# Patient Record
Sex: MALE | Race: WHITE | NOT HISPANIC OR LATINO | Employment: OTHER | RURAL
[De-identification: names, ages, dates, MRNs, and addresses within clinical notes are randomized per-mention and may not be internally consistent; named-entity substitution may affect disease eponyms.]

---

## 2020-06-18 ENCOUNTER — HISTORICAL (OUTPATIENT)
Dept: ADMINISTRATIVE | Facility: HOSPITAL | Age: 55
End: 2020-06-18

## 2020-06-18 LAB
ALBUMIN SERPL BCP-MCNC: 3.4 G/DL (ref 3.5–5)
ALBUMIN/GLOB SERPL: 1.1 {RATIO}
ALP SERPL-CCNC: 93 U/L (ref 45–115)
ALT SERPL W P-5'-P-CCNC: 25 U/L (ref 16–61)
AST SERPL W P-5'-P-CCNC: 17 U/L (ref 15–37)
BASOPHILS # BLD AUTO: 0.02 X10E3/UL (ref 0–0.2)
BASOPHILS NFR BLD AUTO: 0.2 % (ref 0–1)
BILIRUB SERPL-MCNC: 0.7 MG/DL (ref 0–1.2)
BUN SERPL-MCNC: 39 MG/DL (ref 7–18)
CALCIUM SERPL-MCNC: 8.2 MG/DL (ref 8.5–10.1)
CHLORIDE SERPL-SCNC: 99 MMOL/L (ref 98–107)
CO2 SERPL-SCNC: 17 MMOL/L (ref 21–32)
CREAT SERPL-MCNC: 2.71 MG/DL (ref 0.7–1.3)
EOSINOPHIL # BLD AUTO: 0.01 X10E3/UL (ref 0–0.5)
EOSINOPHIL NFR BLD AUTO: 0.1 % (ref 1–4)
ERYTHROCYTE [DISTWIDTH] IN BLOOD BY AUTOMATED COUNT: 12.8 % (ref 11.5–14.5)
GLOBULIN SER-MCNC: 3.1 G/DL (ref 2–4)
GLUCOSE SERPL-MCNC: 716 MG/DL (ref 74–106)
HCT VFR BLD AUTO: 35.6 % (ref 40–54)
HGB BLD-MCNC: 11.7 G/DL (ref 13.5–18)
LIPASE SERPL-CCNC: 147 U/L (ref 73–393)
LYMPHOCYTES # BLD AUTO: 0.7 X10E3/UL (ref 1–4.8)
LYMPHOCYTES NFR BLD AUTO: 7.3 % (ref 27–41)
MCH RBC QN AUTO: 28.7 PG (ref 27–31)
MCHC RBC AUTO-ENTMCNC: 32.9 G/DL (ref 32–36)
MCV RBC AUTO: 88 FL (ref 80–96)
MONOCYTES # BLD AUTO: 0.48 X10E3/UL (ref 0–0.8)
MONOCYTES NFR BLD AUTO: 5 % (ref 2–6)
MPC BLD CALC-MCNC: 11.1 FL (ref 9.4–12.4)
NEUTROPHILS # BLD AUTO: 8.42 X10E3/UL (ref 1.8–7.7)
NEUTROPHILS NFR BLD AUTO: 87.4 % (ref 53–65)
PLATELET # BLD AUTO: 134 X10E3/UL (ref 150–450)
POTASSIUM SERPL-SCNC: 6.4 MMOL/L (ref 3.5–5.1)
PROT SERPL-MCNC: 6.5 G/DL (ref 6.4–8.2)
RBC # BLD AUTO: 4.07 X10E6/UL (ref 4.6–6.2)
SODIUM SERPL-SCNC: 129 MMOL/L (ref 136–145)
WBC # BLD AUTO: 9.63 X10E3/UL (ref 4.5–11)

## 2020-08-08 ENCOUNTER — HISTORICAL (OUTPATIENT)
Dept: ADMINISTRATIVE | Facility: HOSPITAL | Age: 55
End: 2020-08-08

## 2020-08-08 LAB
ALBUMIN SERPL BCP-MCNC: 3.6 G/DL (ref 3.5–5)
ALBUMIN/GLOB SERPL: 1.2 {RATIO}
ALP SERPL-CCNC: 90 U/L (ref 45–115)
ALT SERPL W P-5'-P-CCNC: 34 U/L (ref 16–61)
ANION GAP SERPL CALCULATED.3IONS-SCNC: 20 MMOL/L (ref 7–16)
AST SERPL W P-5'-P-CCNC: 14 U/L (ref 15–37)
BASOPHILS # BLD AUTO: 0.02 X10E3/UL (ref 0–0.2)
BASOPHILS NFR BLD AUTO: 0.3 % (ref 0–1)
BILIRUB SERPL-MCNC: 0.7 MG/DL (ref 0–1.2)
BUN SERPL-MCNC: 51 MG/DL (ref 7–18)
CALCIUM SERPL-MCNC: 8.5 MG/DL (ref 8.5–10.1)
CHLORIDE SERPL-SCNC: 101 MMOL/L (ref 98–107)
CO2 SERPL-SCNC: 17 MMOL/L (ref 21–32)
CREAT SERPL-MCNC: 2.97 MG/DL (ref 0.7–1.3)
EOSINOPHIL # BLD AUTO: 0.09 X10E3/UL (ref 0–0.5)
EOSINOPHIL NFR BLD AUTO: 1.3 % (ref 1–4)
ERYTHROCYTE [DISTWIDTH] IN BLOOD BY AUTOMATED COUNT: 12.2 % (ref 11.5–14.5)
GLOBULIN SER-MCNC: 2.9 G/DL (ref 2–4)
GLUCOSE SERPL-MCNC: 487 MG/DL (ref 74–106)
HCT VFR BLD AUTO: 33.6 % (ref 40–54)
HGB BLD-MCNC: 11.3 G/DL (ref 13.5–18)
LYMPHOCYTES # BLD AUTO: 1.04 X10E3/UL (ref 1–4.8)
LYMPHOCYTES NFR BLD AUTO: 14.5 % (ref 27–41)
MCH RBC QN AUTO: 28.5 PG (ref 27–31)
MCHC RBC AUTO-ENTMCNC: 33.6 G/DL (ref 32–36)
MCV RBC AUTO: 85 FL (ref 80–96)
MONOCYTES # BLD AUTO: 0.89 X10E3/UL (ref 0–0.8)
MONOCYTES NFR BLD AUTO: 12.4 % (ref 2–6)
MPC BLD CALC-MCNC: 10.5 FL (ref 9.4–12.4)
NEUTROPHILS # BLD AUTO: 5.15 X10E3/UL (ref 1.8–7.7)
NEUTROPHILS NFR BLD AUTO: 71.5 % (ref 53–65)
PLATELET # BLD AUTO: 144 X10E3/UL (ref 150–450)
POTASSIUM SERPL-SCNC: 5.1 MMOL/L (ref 3.5–5.1)
PROT SERPL-MCNC: 6.5 G/DL (ref 6.4–8.2)
RBC # BLD AUTO: 3.96 X10E6/UL (ref 4.6–6.2)
SODIUM SERPL-SCNC: 133 MMOL/L (ref 136–145)
WBC # BLD AUTO: 7.19 X10E3/UL (ref 4.5–11)

## 2020-08-09 ENCOUNTER — HISTORICAL (OUTPATIENT)
Dept: ADMINISTRATIVE | Facility: HOSPITAL | Age: 55
End: 2020-08-09

## 2020-08-09 LAB
ALBUMIN SERPL BCP-MCNC: 3.6 G/DL (ref 3.5–5)
ALBUMIN/GLOB SERPL: 1.2 {RATIO}
ALP SERPL-CCNC: 94 U/L (ref 45–115)
ALT SERPL W P-5'-P-CCNC: 33 U/L (ref 16–61)
ANION GAP SERPL CALCULATED.3IONS-SCNC: 20 MMOL/L (ref 7–16)
ANION GAP SERPL CALCULATED.3IONS-SCNC: 22 MMOL/L (ref 7–16)
AST SERPL W P-5'-P-CCNC: 18 U/L (ref 15–37)
BACTERIA #/AREA URNS HPF: ABNORMAL /HPF
BASOPHILS # BLD AUTO: 0.02 X10E3/UL (ref 0–0.2)
BASOPHILS NFR BLD AUTO: 0.3 % (ref 0–1)
BILIRUB SERPL-MCNC: 0.7 MG/DL (ref 0–1.2)
BILIRUB UR QL STRIP: ABNORMAL MG/DL
BUN SERPL-MCNC: 44 MG/DL (ref 7–18)
BUN SERPL-MCNC: 46 MG/DL (ref 7–18)
CALCIUM SERPL-MCNC: 8.4 MG/DL (ref 8.5–10.1)
CALCIUM SERPL-MCNC: 8.8 MG/DL (ref 8.5–10.1)
CHLORIDE SERPL-SCNC: 102 MMOL/L (ref 98–107)
CHLORIDE SERPL-SCNC: 105 MMOL/L (ref 98–107)
CLARITY UR: CLEAR
CLARITY UR: CLEAR
CO2 SERPL-SCNC: 14 MMOL/L (ref 22–28)
CO2 SERPL-SCNC: 16 MMOL/L (ref 21–32)
CO2 SERPL-SCNC: 16 MMOL/L (ref 21–32)
CO2 SERPL-SCNC: 17 MMOL/L (ref 22–28)
COLOR UR: YELLOW
COLOR UR: YELLOW
CREAT SERPL-MCNC: 2.7 MG/DL (ref 0.7–1.3)
CREAT SERPL-MCNC: 2.88 MG/DL (ref 0.7–1.3)
EOSINOPHIL # BLD AUTO: 0.05 X10E3/UL (ref 0–0.5)
EOSINOPHIL NFR BLD AUTO: 0.7 % (ref 1–4)
ERYTHROCYTE [DISTWIDTH] IN BLOOD BY AUTOMATED COUNT: 12.6 % (ref 11.5–14.5)
GLOBULIN SER-MCNC: 3.1 G/DL (ref 2–4)
GLUCOSE SERPL-MCNC: 275 MG/DL (ref 74–106)
GLUCOSE SERPL-MCNC: 418 MG/DL (ref 74–106)
GLUCOSE UR STRIP-MCNC: ABNORMAL MG/DL
HCO3 UR-SCNC: 13 MMOL/L (ref 21–28)
HCO3 UR-SCNC: 15 MMOL/L (ref 21–28)
HCT VFR BLD AUTO: 37.1 % (ref 40–54)
HGB BLD-MCNC: 12.2 G/DL (ref 13.5–18)
KETONES UR STRIP-SCNC: ABNORMAL MG/DL
LEUKOCYTE ESTERASE UR QL STRIP: NEGATIVE LEU/UL
LYMPHOCYTES # BLD AUTO: 1.04 X10E3/UL (ref 1–4.8)
LYMPHOCYTES NFR BLD AUTO: 14.4 % (ref 27–41)
MAGNESIUM SERPL-MCNC: 1.7 MG/DL (ref 1.7–2.3)
MCH RBC QN AUTO: 28.2 PG (ref 27–31)
MCHC RBC AUTO-ENTMCNC: 32.9 G/DL (ref 32–36)
MCV RBC AUTO: 86 FL (ref 80–96)
MONOCYTES # BLD AUTO: 0.56 X10E3/UL (ref 0–0.8)
MONOCYTES NFR BLD AUTO: 7.7 % (ref 2–6)
MPC BLD CALC-MCNC: 10.5 FL (ref 9.4–12.4)
NEUTROPHILS # BLD AUTO: 5.57 X10E3/UL (ref 1.8–7.7)
NEUTROPHILS NFR BLD AUTO: 76.9 % (ref 53–65)
NITRITE UR QL STRIP: NEGATIVE
PCO2 BLDA: 31 MM HG (ref 35–48)
PCO2 BLDA: 36 MM HG (ref 35–48)
PH SMN: 7.23 PH UNITS (ref 7.35–7.45)
PH SMN: 7.24 PH UNITS (ref 7.35–7.45)
PH UR STRIP: 5.5 PH UNITS (ref 5–8)
PLATELET # BLD AUTO: 152 X10E3/UL (ref 150–450)
PO2 BLDA: 89 MM HG (ref 83–108)
PO2 BLDA: 98 MM HG (ref 83–108)
POC BASE EXCESS ARTERIAL: -11.1 MMOL/L (ref -2–3)
POC BASE EXCESS ARTERIAL: -13.3 MMOL/L (ref -2–3)
POC SATURATED O2: 95 % (ref 95–98)
POC SATURATED O2: 96 % (ref 95–98)
POTASSIUM SERPL-SCNC: 4.6 MMOL/L (ref 3.5–5.1)
POTASSIUM SERPL-SCNC: 4.8 MMOL/L (ref 3.5–5.1)
PROT SERPL-MCNC: 6.7 G/DL (ref 6.4–8.2)
PROT UR QL STRIP: ABNORMAL MG/DL
RBC # BLD AUTO: 4.32 X10E6/UL (ref 4.6–6.2)
RBC # UR STRIP: NEGATIVE ERY/UL
RBC #/AREA URNS HPF: ABNORMAL /HPF (ref 0–3)
SODIUM SERPL-SCNC: 135 MMOL/L (ref 136–145)
SODIUM SERPL-SCNC: 136 MMOL/L (ref 136–145)
SP GR UR STRIP: 1.02 (ref 1–1.03)
SQUAMOUS #/AREA URNS LPF: ABNORMAL /LPF
UROBILINOGEN UR STRIP-ACNC: 0.2 MG/DL
WBC # BLD AUTO: 7.24 X10E3/UL (ref 4.5–11)
WBC #/AREA URNS HPF: ABNORMAL /HPF (ref 0–5)

## 2020-08-12 LAB
GLUCOSE SERPL-MCNC: 224 MG/DL (ref 70–105)
GLUCOSE SERPL-MCNC: 292 MG/DL (ref 70–105)

## 2020-09-08 ENCOUNTER — HISTORICAL (OUTPATIENT)
Dept: ADMINISTRATIVE | Facility: HOSPITAL | Age: 55
End: 2020-09-08

## 2021-02-09 ENCOUNTER — HISTORICAL (OUTPATIENT)
Dept: ADMINISTRATIVE | Facility: HOSPITAL | Age: 56
End: 2021-02-09

## 2021-02-10 ENCOUNTER — HISTORICAL (OUTPATIENT)
Dept: ADMINISTRATIVE | Facility: HOSPITAL | Age: 56
End: 2021-02-10

## 2021-05-24 ENCOUNTER — LAB REQUISITION (OUTPATIENT)
Dept: LAB | Facility: HOSPITAL | Age: 56
End: 2021-05-24
Payer: MEDICARE

## 2021-05-24 ENCOUNTER — HOSPITAL ENCOUNTER (OUTPATIENT)
Dept: RADIOLOGY | Facility: HOSPITAL | Age: 56
Discharge: HOME | End: 2021-05-24
Attending: NURSE PRACTITIONER
Payer: MEDICARE

## 2021-05-24 DIAGNOSIS — J06.9 UPPER RESPIRATORY INFECTION WITH COUGH AND CONGESTION: ICD-10-CM

## 2021-05-24 DIAGNOSIS — J06.9 ACUTE UPPER RESPIRATORY INFECTION, UNSPECIFIED: ICD-10-CM

## 2021-05-24 LAB
BASOPHILS # BLD AUTO: 0.02 K/UL (ref 0–0.2)
BASOPHILS NFR BLD AUTO: 0.3 % (ref 0–1)
DIFFERENTIAL METHOD BLD: ABNORMAL
EOSINOPHIL # BLD AUTO: 0.05 K/UL (ref 0–0.5)
EOSINOPHIL NFR BLD AUTO: 0.7 % (ref 1–4)
ERYTHROCYTE [DISTWIDTH] IN BLOOD BY AUTOMATED COUNT: 12.7 % (ref 11.5–14.5)
HCT VFR BLD AUTO: 42.1 % (ref 40–54)
HGB BLD-MCNC: 14.3 G/DL (ref 13.5–18)
LYMPHOCYTES # BLD AUTO: 0.86 K/UL (ref 1–4.8)
LYMPHOCYTES NFR BLD AUTO: 11.8 % (ref 27–41)
MCH RBC QN AUTO: 27.9 PG (ref 27–31)
MCHC RBC AUTO-ENTMCNC: 34 G/DL (ref 32–36)
MCV RBC AUTO: 82.2 FL (ref 80–96)
MONOCYTES # BLD AUTO: 1.05 K/UL (ref 0–0.8)
MONOCYTES NFR BLD AUTO: 14.4 % (ref 2–6)
MPC BLD CALC-MCNC: 10.8 FL (ref 9.4–12.4)
NEUTROPHILS # BLD AUTO: 5.29 K/UL (ref 1.8–7.7)
NEUTROPHILS NFR BLD AUTO: 72.8 % (ref 53–65)
PLATELET # BLD AUTO: 147 K/UL (ref 150–400)
RBC # BLD AUTO: 5.12 M/UL (ref 4.6–6.2)
WBC # BLD AUTO: 7.27 K/UL (ref 4.5–11)

## 2021-05-24 PROCEDURE — 71046 X-RAY EXAM CHEST 2 VIEWS: CPT | Mod: TC

## 2021-05-24 PROCEDURE — 85025 COMPLETE CBC W/AUTO DIFF WBC: CPT

## 2021-07-20 ENCOUNTER — HOSPITAL ENCOUNTER (EMERGENCY)
Facility: HOSPITAL | Age: 56
Discharge: HOME | End: 2021-07-21
Payer: MEDICARE

## 2021-07-20 DIAGNOSIS — R11.2 NON-INTRACTABLE VOMITING WITH NAUSEA, UNSPECIFIED VOMITING TYPE: ICD-10-CM

## 2021-07-20 DIAGNOSIS — U07.1 COVID-19 VIRUS DETECTED: Primary | ICD-10-CM

## 2021-07-20 LAB
ALBUMIN SERPL BCP-MCNC: 3.3 G/DL (ref 3.5–5)
ALBUMIN/GLOB SERPL: 1 {RATIO}
ALP SERPL-CCNC: 73 U/L (ref 45–115)
ALT SERPL W P-5'-P-CCNC: 40 U/L (ref 16–61)
ANION GAP SERPL CALCULATED.3IONS-SCNC: 17 MMOL/L (ref 7–16)
AST SERPL W P-5'-P-CCNC: 31 U/L (ref 15–37)
BASOPHILS # BLD AUTO: 0.01 K/UL (ref 0–0.2)
BASOPHILS NFR BLD AUTO: 0.3 % (ref 0–1)
BILIRUB SERPL-MCNC: 0.5 MG/DL (ref 0–1.2)
BUN SERPL-MCNC: 62 MG/DL (ref 7–18)
BUN/CREAT SERPL: 13 (ref 6–20)
CALCIUM SERPL-MCNC: 8.3 MG/DL (ref 8.5–10.1)
CHLORIDE SERPL-SCNC: 99 MMOL/L (ref 98–107)
CO2 SERPL-SCNC: 20 MMOL/L (ref 21–32)
CREAT SERPL-MCNC: 4.62 MG/DL (ref 0.7–1.3)
DIFFERENTIAL METHOD BLD: ABNORMAL
EOSINOPHIL # BLD AUTO: 0 K/UL (ref 0–0.5)
EOSINOPHIL NFR BLD AUTO: 0 % (ref 1–4)
ERYTHROCYTE [DISTWIDTH] IN BLOOD BY AUTOMATED COUNT: 12.6 % (ref 11.5–14.5)
FLUAV AG UPPER RESP QL IA.RAPID: NEGATIVE
FLUBV AG UPPER RESP QL IA.RAPID: NEGATIVE
GLOBULIN SER-MCNC: 3.2 G/DL (ref 2–4)
GLUCOSE SERPL-MCNC: 297 MG/DL (ref 74–106)
HCT VFR BLD AUTO: 35.1 % (ref 40–54)
HGB BLD-MCNC: 12.1 G/DL (ref 13.5–18)
LYMPHOCYTES # BLD AUTO: 0.87 K/UL (ref 1–4.8)
LYMPHOCYTES NFR BLD AUTO: 22.5 % (ref 27–41)
MCH RBC QN AUTO: 28.3 PG (ref 27–31)
MCHC RBC AUTO-ENTMCNC: 34.5 G/DL (ref 32–36)
MCV RBC AUTO: 82.2 FL (ref 80–96)
MONOCYTES # BLD AUTO: 0.58 K/UL (ref 0–0.8)
MONOCYTES NFR BLD AUTO: 15 % (ref 2–6)
MPC BLD CALC-MCNC: 10.6 FL (ref 9.4–12.4)
NEUTROPHILS # BLD AUTO: 2.41 K/UL (ref 1.8–7.7)
NEUTROPHILS NFR BLD AUTO: 62.2 % (ref 53–65)
PLATELET # BLD AUTO: 63 K/UL (ref 150–400)
POTASSIUM SERPL-SCNC: 5.3 MMOL/L (ref 3.5–5.1)
PROT SERPL-MCNC: 6.5 G/DL (ref 6.4–8.2)
RBC # BLD AUTO: 4.27 M/UL (ref 4.6–6.2)
SARS-COV+SARS-COV-2 AG RESP QL IA.RAPID: POSITIVE
SODIUM SERPL-SCNC: 131 MMOL/L (ref 136–145)
WBC # BLD AUTO: 3.87 K/UL (ref 4.5–11)

## 2021-07-20 PROCEDURE — 80053 COMPREHEN METABOLIC PANEL: CPT | Performed by: NURSE PRACTITIONER

## 2021-07-20 PROCEDURE — 96374 THER/PROPH/DIAG INJ IV PUSH: CPT

## 2021-07-20 PROCEDURE — 96375 TX/PRO/DX INJ NEW DRUG ADDON: CPT

## 2021-07-20 PROCEDURE — 99284 EMERGENCY DEPT VISIT MOD MDM: CPT | Mod: 25

## 2021-07-20 PROCEDURE — 87428 SARSCOV & INF VIR A&B AG IA: CPT | Performed by: NURSE PRACTITIONER

## 2021-07-20 PROCEDURE — 85025 COMPLETE CBC W/AUTO DIFF WBC: CPT | Performed by: NURSE PRACTITIONER

## 2021-07-20 PROCEDURE — 99283 EMERGENCY DEPT VISIT LOW MDM: CPT | Mod: GF | Performed by: NURSE PRACTITIONER

## 2021-07-20 PROCEDURE — 96360 HYDRATION IV INFUSION INIT: CPT

## 2021-07-20 PROCEDURE — 25000003 PHARM REV CODE 250: Performed by: NURSE PRACTITIONER

## 2021-07-20 PROCEDURE — 36415 COLL VENOUS BLD VENIPUNCTURE: CPT | Performed by: NURSE PRACTITIONER

## 2021-07-20 PROCEDURE — 96361 HYDRATE IV INFUSION ADD-ON: CPT

## 2021-07-20 RX ORDER — ROSUVASTATIN CALCIUM 5 MG/1
TABLET, COATED ORAL
COMMUNITY

## 2021-07-20 RX ORDER — OMEPRAZOLE 20 MG/1
CAPSULE, DELAYED RELEASE ORAL
COMMUNITY
End: 2022-12-27

## 2021-07-20 RX ORDER — PREDNISONE 5 MG/1
1 TABLET ORAL DAILY
COMMUNITY
Start: 2021-04-08

## 2021-07-20 RX ORDER — CARVEDILOL 12.5 MG/1
TABLET ORAL
COMMUNITY

## 2021-07-20 RX ORDER — ONDANSETRON 4 MG/1
4 TABLET, FILM COATED ORAL EVERY 6 HOURS
Qty: 20 TABLET | Refills: 0 | Status: SHIPPED | OUTPATIENT
Start: 2021-07-20 | End: 2021-07-20

## 2021-07-20 RX ORDER — AZITHROMYCIN 250 MG/1
250 TABLET, FILM COATED ORAL DAILY
Qty: 6 TABLET | Refills: 0 | Status: SHIPPED | OUTPATIENT
Start: 2021-07-20 | End: 2022-12-27

## 2021-07-20 RX ORDER — ONDANSETRON 4 MG/1
4 TABLET, ORALLY DISINTEGRATING ORAL EVERY 6 HOURS PRN
Qty: 20 TABLET | Refills: 0 | Status: SHIPPED | OUTPATIENT
Start: 2021-07-20 | End: 2021-07-21

## 2021-07-20 RX ORDER — LOSARTAN POTASSIUM 50 MG/1
50 TABLET ORAL DAILY
COMMUNITY
Start: 2021-02-22 | End: 2022-12-27

## 2021-07-20 RX ORDER — INSULIN ASPART 100 [IU]/ML
INJECTION, SOLUTION INTRAVENOUS; SUBCUTANEOUS
COMMUNITY
Start: 2021-04-07 | End: 2022-12-27

## 2021-07-20 RX ORDER — INSULIN DEGLUDEC 100 U/ML
40 INJECTION, SOLUTION SUBCUTANEOUS
COMMUNITY
End: 2022-12-27

## 2021-07-20 RX ORDER — ACETAMINOPHEN 500 MG
1000 TABLET ORAL
Status: COMPLETED | OUTPATIENT
Start: 2021-07-20 | End: 2021-07-20

## 2021-07-20 RX ORDER — LISINOPRIL 5 MG/1
TABLET ORAL
COMMUNITY
End: 2022-12-27

## 2021-07-20 RX ADMIN — ACETAMINOPHEN 1000 MG: 500 TABLET, FILM COATED ORAL at 10:07

## 2021-07-20 RX ADMIN — SODIUM CHLORIDE 1000 ML: 9 INJECTION, SOLUTION INTRAVENOUS at 10:07

## 2021-07-21 ENCOUNTER — INFUSION (OUTPATIENT)
Dept: INFECTIOUS DISEASES | Facility: HOSPITAL | Age: 56
End: 2021-07-21
Payer: MEDICARE

## 2021-07-21 VITALS
HEART RATE: 94 BPM | OXYGEN SATURATION: 94 % | WEIGHT: 203 LBS | HEIGHT: 70 IN | TEMPERATURE: 100 F | DIASTOLIC BLOOD PRESSURE: 84 MMHG | SYSTOLIC BLOOD PRESSURE: 173 MMHG | RESPIRATION RATE: 18 BRPM | BODY MASS INDEX: 29.06 KG/M2

## 2021-07-21 VITALS
TEMPERATURE: 100 F | OXYGEN SATURATION: 96 % | RESPIRATION RATE: 18 BRPM | SYSTOLIC BLOOD PRESSURE: 176 MMHG | HEART RATE: 84 BPM | DIASTOLIC BLOOD PRESSURE: 98 MMHG

## 2021-07-21 DIAGNOSIS — U07.1 COVID-19: Primary | ICD-10-CM

## 2021-07-21 PROCEDURE — 63600175 PHARM REV CODE 636 W HCPCS: Performed by: NURSE PRACTITIONER

## 2021-07-21 PROCEDURE — M0243 CASIRIVI AND IMDEVI INFUSION: HCPCS | Performed by: NURSE PRACTITIONER

## 2021-07-21 PROCEDURE — A4216 STERILE WATER/SALINE, 10 ML: HCPCS | Performed by: NURSE PRACTITIONER

## 2021-07-21 PROCEDURE — 25000003 PHARM REV CODE 250: Performed by: NURSE PRACTITIONER

## 2021-07-21 RX ORDER — DIPHENHYDRAMINE HYDROCHLORIDE 50 MG/ML
25 INJECTION INTRAMUSCULAR; INTRAVENOUS ONCE AS NEEDED
Status: DISCONTINUED | OUTPATIENT
Start: 2021-07-21 | End: 2022-12-27

## 2021-07-21 RX ORDER — ACETAMINOPHEN 325 MG/1
650 TABLET ORAL ONCE AS NEEDED
Status: ACTIVE | OUTPATIENT
Start: 2021-07-21 | End: 2032-12-17

## 2021-07-21 RX ORDER — ONDANSETRON 4 MG/1
4 TABLET, ORALLY DISINTEGRATING ORAL ONCE AS NEEDED
Status: DISCONTINUED | OUTPATIENT
Start: 2021-07-21 | End: 2022-12-27

## 2021-07-21 RX ORDER — EPINEPHRINE 0.3 MG/.3ML
0.3 INJECTION SUBCUTANEOUS
Status: ACTIVE | OUTPATIENT
Start: 2021-07-21

## 2021-07-21 RX ORDER — ONDANSETRON 2 MG/ML
4 INJECTION INTRAMUSCULAR; INTRAVENOUS
Status: COMPLETED | OUTPATIENT
Start: 2021-07-21 | End: 2021-07-21

## 2021-07-21 RX ORDER — ALBUTEROL SULFATE 90 UG/1
2 AEROSOL, METERED RESPIRATORY (INHALATION)
Status: DISCONTINUED | OUTPATIENT
Start: 2021-07-21 | End: 2022-12-27

## 2021-07-21 RX ORDER — SODIUM CHLORIDE 0.9 % (FLUSH) 0.9 %
10 SYRINGE (ML) INJECTION
Status: DISCONTINUED | OUTPATIENT
Start: 2021-07-21 | End: 2022-12-27

## 2021-07-21 RX ADMIN — SODIUM CHLORIDE, PRESERVATIVE FREE 10 ML: 5 INJECTION INTRAVENOUS at 03:07

## 2021-07-21 RX ADMIN — ONDANSETRON 4 MG: 2 INJECTION INTRAMUSCULAR; INTRAVENOUS at 12:07

## 2021-07-21 RX ADMIN — CASIRIVIMAB 600 MG: 300 INJECTION, SOLUTION, CONCENTRATE INTRAVENOUS at 03:07

## 2021-07-28 ENCOUNTER — HOSPITAL ENCOUNTER (EMERGENCY)
Facility: HOSPITAL | Age: 56
Discharge: HOME | End: 2021-07-28
Payer: MEDICARE

## 2021-07-28 VITALS
WEIGHT: 182 LBS | DIASTOLIC BLOOD PRESSURE: 83 MMHG | HEIGHT: 70 IN | OXYGEN SATURATION: 99 % | SYSTOLIC BLOOD PRESSURE: 165 MMHG | RESPIRATION RATE: 20 BRPM | HEART RATE: 86 BPM | BODY MASS INDEX: 26.05 KG/M2 | TEMPERATURE: 99 F

## 2021-07-28 DIAGNOSIS — U07.1 GASTROENTERITIS DUE TO COVID-19 VIRUS: ICD-10-CM

## 2021-07-28 DIAGNOSIS — R11.2 NON-INTRACTABLE VOMITING WITH NAUSEA, UNSPECIFIED VOMITING TYPE: ICD-10-CM

## 2021-07-28 DIAGNOSIS — U07.1 COVID-19: ICD-10-CM

## 2021-07-28 DIAGNOSIS — E86.0 DEHYDRATION: Primary | ICD-10-CM

## 2021-07-28 DIAGNOSIS — A08.39 GASTROENTERITIS DUE TO COVID-19 VIRUS: ICD-10-CM

## 2021-07-28 LAB
ALBUMIN SERPL BCP-MCNC: 3 G/DL (ref 3.5–5)
ALBUMIN/GLOB SERPL: 0.8 {RATIO}
ALP SERPL-CCNC: 78 U/L (ref 45–115)
ALT SERPL W P-5'-P-CCNC: 28 U/L (ref 16–61)
ANION GAP SERPL CALCULATED.3IONS-SCNC: 19 MMOL/L (ref 7–16)
AST SERPL W P-5'-P-CCNC: 16 U/L (ref 15–37)
BASOPHILS # BLD AUTO: 0.01 K/UL (ref 0–0.2)
BASOPHILS NFR BLD AUTO: 0.2 % (ref 0–1)
BILIRUB SERPL-MCNC: 0.8 MG/DL (ref 0–1.2)
BUN SERPL-MCNC: 62 MG/DL (ref 7–18)
BUN/CREAT SERPL: 18 (ref 6–20)
CALCIUM SERPL-MCNC: 8.8 MG/DL (ref 8.5–10.1)
CHLORIDE SERPL-SCNC: 101 MMOL/L (ref 98–107)
CO2 SERPL-SCNC: 20 MMOL/L (ref 21–32)
CREAT SERPL-MCNC: 3.47 MG/DL (ref 0.7–1.3)
DIFFERENTIAL METHOD BLD: ABNORMAL
EOSINOPHIL # BLD AUTO: 0.02 K/UL (ref 0–0.5)
EOSINOPHIL NFR BLD AUTO: 0.3 % (ref 1–4)
ERYTHROCYTE [DISTWIDTH] IN BLOOD BY AUTOMATED COUNT: 12.4 % (ref 11.5–14.5)
FLUAV AG UPPER RESP QL IA.RAPID: NEGATIVE
FLUBV AG UPPER RESP QL IA.RAPID: NEGATIVE
GLOBULIN SER-MCNC: 3.6 G/DL (ref 2–4)
GLUCOSE SERPL-MCNC: 403 MG/DL (ref 74–106)
HCT VFR BLD AUTO: 34.7 % (ref 40–54)
HGB BLD-MCNC: 11.9 G/DL (ref 13.5–18)
LYMPHOCYTES # BLD AUTO: 0.82 K/UL (ref 1–4.8)
LYMPHOCYTES NFR BLD AUTO: 13 % (ref 27–41)
MCH RBC QN AUTO: 28.1 PG (ref 27–31)
MCHC RBC AUTO-ENTMCNC: 34.3 G/DL (ref 32–36)
MCV RBC AUTO: 82 FL (ref 80–96)
MONOCYTES # BLD AUTO: 1.1 K/UL (ref 0–0.8)
MONOCYTES NFR BLD AUTO: 17.4 % (ref 2–6)
MPC BLD CALC-MCNC: 10.2 FL (ref 9.4–12.4)
NEUTROPHILS # BLD AUTO: 4.36 K/UL (ref 1.8–7.7)
NEUTROPHILS NFR BLD AUTO: 69.1 % (ref 53–65)
PLATELET # BLD AUTO: 117 K/UL (ref 150–400)
POTASSIUM SERPL-SCNC: 5.4 MMOL/L (ref 3.5–5.1)
PROT SERPL-MCNC: 6.6 G/DL (ref 6.4–8.2)
RAPID GROUP A STREP: NEGATIVE
RBC # BLD AUTO: 4.23 M/UL (ref 4.6–6.2)
SODIUM SERPL-SCNC: 135 MMOL/L (ref 136–145)
WBC # BLD AUTO: 6.31 K/UL (ref 4.5–11)

## 2021-07-28 PROCEDURE — 87880 STREP A ASSAY W/OPTIC: CPT | Performed by: NURSE PRACTITIONER

## 2021-07-28 PROCEDURE — 63600175 PHARM REV CODE 636 W HCPCS: Performed by: NURSE PRACTITIONER

## 2021-07-28 PROCEDURE — 96365 THER/PROPH/DIAG IV INF INIT: CPT

## 2021-07-28 PROCEDURE — 85025 COMPLETE CBC W/AUTO DIFF WBC: CPT | Performed by: NURSE PRACTITIONER

## 2021-07-28 PROCEDURE — 87081 CULTURE SCREEN ONLY: CPT | Performed by: NURSE PRACTITIONER

## 2021-07-28 PROCEDURE — 36415 COLL VENOUS BLD VENIPUNCTURE: CPT | Performed by: NURSE PRACTITIONER

## 2021-07-28 PROCEDURE — 99283 EMERGENCY DEPT VISIT LOW MDM: CPT | Mod: GF | Performed by: NURSE PRACTITIONER

## 2021-07-28 PROCEDURE — 80053 COMPREHEN METABOLIC PANEL: CPT | Performed by: NURSE PRACTITIONER

## 2021-07-28 PROCEDURE — 99284 EMERGENCY DEPT VISIT MOD MDM: CPT | Mod: 25

## 2021-07-28 PROCEDURE — 87804 INFLUENZA ASSAY W/OPTIC: CPT | Mod: 59 | Performed by: NURSE PRACTITIONER

## 2021-07-28 PROCEDURE — 96361 HYDRATE IV INFUSION ADD-ON: CPT

## 2021-07-28 PROCEDURE — 25000003 PHARM REV CODE 250: Performed by: NURSE PRACTITIONER

## 2021-07-28 RX ORDER — PROMETHAZINE HYDROCHLORIDE 25 MG/1
25 TABLET ORAL EVERY 6 HOURS PRN
Qty: 20 TABLET | Refills: 0 | Status: SHIPPED | OUTPATIENT
Start: 2021-07-28 | End: 2022-12-27

## 2021-07-28 RX ADMIN — PROMETHAZINE HYDROCHLORIDE 12.5 MG: 25 INJECTION INTRAMUSCULAR; INTRAVENOUS at 03:07

## 2021-07-28 RX ADMIN — SODIUM CHLORIDE 1000 ML: 9 INJECTION, SOLUTION INTRAVENOUS at 04:07

## 2022-12-27 ENCOUNTER — HOSPITAL ENCOUNTER (EMERGENCY)
Facility: HOSPITAL | Age: 57
Discharge: HOME | End: 2022-12-27
Payer: MEDICARE

## 2022-12-27 VITALS
SYSTOLIC BLOOD PRESSURE: 163 MMHG | TEMPERATURE: 99 F | DIASTOLIC BLOOD PRESSURE: 95 MMHG | BODY MASS INDEX: 28.02 KG/M2 | WEIGHT: 200.13 LBS | RESPIRATION RATE: 18 BRPM | HEART RATE: 83 BPM | OXYGEN SATURATION: 97 % | HEIGHT: 71 IN

## 2022-12-27 DIAGNOSIS — S62.354A NONDISPLACED FRACTURE OF SHAFT OF FOURTH METACARPAL BONE, RIGHT HAND, INITIAL ENCOUNTER FOR CLOSED FRACTURE: Primary | ICD-10-CM

## 2022-12-27 PROCEDURE — 99284 EMERGENCY DEPT VISIT MOD MDM: CPT

## 2022-12-27 PROCEDURE — 63600175 PHARM REV CODE 636 W HCPCS: Performed by: NURSE PRACTITIONER

## 2022-12-27 PROCEDURE — 29125 APPL SHORT ARM SPLINT STATIC: CPT | Mod: RT

## 2022-12-27 PROCEDURE — 96372 THER/PROPH/DIAG INJ SC/IM: CPT | Performed by: NURSE PRACTITIONER

## 2022-12-27 PROCEDURE — 26600 TREAT METACARPAL FRACTURE: CPT | Mod: 54,RT | Performed by: NURSE PRACTITIONER

## 2022-12-27 PROCEDURE — 99283 EMERGENCY DEPT VISIT LOW MDM: CPT | Mod: 25 | Performed by: NURSE PRACTITIONER

## 2022-12-27 RX ORDER — TACROLIMUS EXTENDED-RELEASE CAPSULES 0.5 MG/1
1 CAPSULE, COATED, EXTENDED RELEASE ORAL
COMMUNITY
Start: 2022-12-22

## 2022-12-27 RX ORDER — NAPROXEN SODIUM 220 MG/1
81 TABLET, FILM COATED ORAL DAILY
COMMUNITY

## 2022-12-27 RX ORDER — MYCOPHENOLATE MOFETIL 250 MG/1
CAPSULE ORAL
COMMUNITY

## 2022-12-27 RX ORDER — CALCIUM CITRATE/VITAMIN D3 200MG-6.25
1 TABLET ORAL
COMMUNITY
Start: 2022-08-16

## 2022-12-27 RX ORDER — MYCOPHENOLIC ACID 180 MG/1
360 TABLET, DELAYED RELEASE ORAL 2 TIMES DAILY
COMMUNITY
Start: 2022-12-22

## 2022-12-27 RX ORDER — KETOROLAC TROMETHAMINE 30 MG/ML
30 INJECTION, SOLUTION INTRAMUSCULAR; INTRAVENOUS
Status: COMPLETED | OUTPATIENT
Start: 2022-12-27 | End: 2022-12-27

## 2022-12-27 RX ADMIN — KETOROLAC TROMETHAMINE 30 MG: 30 INJECTION, SOLUTION INTRAMUSCULAR at 10:12

## 2022-12-27 NOTE — ED TRIAGE NOTES
Patient states that he was in the back of the truck yseterday around 1630 and fell out of it and landed on Right hand. Notable swelling to right hand. Rates pain at an 8.

## 2022-12-27 NOTE — ED PROVIDER NOTES
Encounter Date: 12/27/2022       History     Chief Complaint   Patient presents with    Hand Pain     58 y/o WM presents pov with c/o right hand pain/swelling after falling from the back of his truck to the asphalt yesterday. Denies hitting his head.    Review of patient's allergies indicates:   Allergen Reactions    Doxycycline      Other reaction(s): Unknown    Morphine Other (See Comments) and Nausea And Vomiting     nausea  nausea    Oxycodone-acetaminophen Nausea Only     Past Medical History:   Diagnosis Date    Diabetes mellitus     Encounter for blood transfusion     GERD (gastroesophageal reflux disease)     Hypertension     Renal disorder      Past Surgical History:   Procedure Laterality Date    ABDOMINAL SURGERY      CHOLECYSTECTOMY      gastric pacemaker      KIDNEY TRANSPLANT       History reviewed. No pertinent family history.  Social History     Tobacco Use    Smoking status: Never   Substance Use Topics    Alcohol use: Never    Drug use: Never     Review of Systems   Respiratory:  Negative for apnea, cough, choking, chest tightness, shortness of breath, wheezing and stridor.    Cardiovascular:  Negative for chest pain, palpitations and leg swelling.   Musculoskeletal:  Positive for arthralgias.        Right hand pain   All other systems reviewed and are negative.    Physical Exam     Initial Vitals [12/27/22 1034]   BP Pulse Resp Temp SpO2   (!) 163/95 83 18 99.2 °F (37.3 °C) 97 %      MAP       --         Physical Exam    Nursing note and vitals reviewed.  Constitutional: He appears well-developed and well-nourished. No distress.   HENT:   Head: Normocephalic.   Eyes: Conjunctivae and EOM are normal.   Neck:   Normal range of motion.  Cardiovascular:  Normal rate, regular rhythm, normal heart sounds and intact distal pulses.     Exam reveals no gallop and no friction rub.       No murmur heard.  Pulmonary/Chest: Breath sounds normal. No respiratory distress. He has no wheezes. He has no rhonchi. He  has no rales. He exhibits no tenderness.   Musculoskeletal:        Arms:       Cervical back: Normal range of motion.     Skin: Skin is warm and dry. Capillary refill takes less than 2 seconds.   Psychiatric: He has a normal mood and affect. His behavior is normal. Judgment and thought content normal.       Medical Screening Exam   See Full Note    ED Course   Procedures  Labs Reviewed - No data to display       Imaging Results              X-Ray Hand 3 View Right (Final result)  Result time 12/27/22 11:00:11      Final result by Curtis Gupta DO (12/27/22 11:00:11)                   Impression:      Nondisplaced fracture of the 4th metacarpal head.      Electronically signed by: Curtis Gupta  Date:    12/27/2022  Time:    11:00               Narrative:    EXAMINATION:  XR HAND COMPLETE 3 VIEW RIGHT    CLINICAL HISTORY:  Blunt trauma;    TECHNIQUE:  XR HAND COMPLETE 3 VIEW RIGHT    COMPARISON:  None    FINDINGS:  Nondisplaced fracture of the 4th metacarpal head.    No joint abnormality.    No radiopaque foreign bodies.                                       Medications   ketorolac injection 30 mg (30 mg Intramuscular Given 12/27/22 1056)     Medical Decision Making:   Initial Assessment:   58 y/o WM presents pov with c/o right hand pain/swelling after falling from the back of his truck to the asphalt yesterday. Denies hitting his head. Dorsal right lateral hand with moderate swelling and TTP. Pain elicited on active/passive ROM  Differential Diagnosis:   Contusion Right Hand  Fracture Right Hand  Clinical Tests:   Radiological Study: Ordered and Reviewed  ED Management:  Patient sitting in chair in ED # 1. C/O pain right hand. Toradol given IM. Ulna gutter splint applied right hand. Patient tolerated well. Patient discharged home with disc of xray for f/u. Return precautions and f/u instructions given.                 Clinical Impression:   Final diagnoses:  [S62.354A] Nondisplaced fracture of shaft of  fourth metacarpal bone, right hand, initial encounter for closed fracture (Primary)        ED Disposition Condition    Discharge Stable          ED Prescriptions    None       Follow-up Information       Follow up With Specialties Details Why Contact Info    Nikolai Garcia NP Family Medicine Go to  As needed, for follow up [address] [place] [address]  [phone]               RITU Radford  12/27/22 7409

## 2023-11-17 ENCOUNTER — HOSPITAL ENCOUNTER (EMERGENCY)
Facility: HOSPITAL | Age: 58
Discharge: HOME | End: 2023-11-18
Payer: MEDICARE

## 2023-11-17 DIAGNOSIS — A08.4 VIRAL GASTROENTERITIS: Primary | ICD-10-CM

## 2023-11-17 PROCEDURE — 99284 EMERGENCY DEPT VISIT MOD MDM: CPT | Mod: 25

## 2023-11-17 PROCEDURE — 99284 EMERGENCY DEPT VISIT MOD MDM: CPT | Mod: GF | Performed by: NURSE PRACTITIONER

## 2023-11-18 VITALS
HEART RATE: 99 BPM | BODY MASS INDEX: 27.89 KG/M2 | OXYGEN SATURATION: 97 % | SYSTOLIC BLOOD PRESSURE: 158 MMHG | RESPIRATION RATE: 20 BRPM | WEIGHT: 200 LBS | DIASTOLIC BLOOD PRESSURE: 79 MMHG | TEMPERATURE: 100 F

## 2023-11-18 LAB
FLUAV AG UPPER RESP QL IA.RAPID: NEGATIVE
FLUBV AG UPPER RESP QL IA.RAPID: NEGATIVE
SARS-COV+SARS-COV-2 AG RESP QL IA.RAPID: NEGATIVE

## 2023-11-18 PROCEDURE — 87426 SARSCOV CORONAVIRUS AG IA: CPT | Performed by: NURSE PRACTITIONER

## 2023-11-18 PROCEDURE — 96375 TX/PRO/DX INJ NEW DRUG ADDON: CPT

## 2023-11-18 PROCEDURE — 25000003 PHARM REV CODE 250: Performed by: NURSE PRACTITIONER

## 2023-11-18 PROCEDURE — 96374 THER/PROPH/DIAG INJ IV PUSH: CPT

## 2023-11-18 PROCEDURE — 87804 INFLUENZA ASSAY W/OPTIC: CPT | Performed by: NURSE PRACTITIONER

## 2023-11-18 PROCEDURE — 63600175 PHARM REV CODE 636 W HCPCS: Performed by: NURSE PRACTITIONER

## 2023-11-18 RX ORDER — PROMETHAZINE HYDROCHLORIDE 25 MG/1
25 SUPPOSITORY RECTAL EVERY 6 HOURS PRN
Qty: 10 SUPPOSITORY | Refills: 0 | OUTPATIENT
Start: 2023-11-18 | End: 2024-02-24

## 2023-11-18 RX ORDER — ACETAMINOPHEN 500 MG
1000 TABLET ORAL
Status: COMPLETED | OUTPATIENT
Start: 2023-11-18 | End: 2023-11-18

## 2023-11-18 RX ORDER — ONDANSETRON 2 MG/ML
4 INJECTION INTRAMUSCULAR; INTRAVENOUS
Status: COMPLETED | OUTPATIENT
Start: 2023-11-18 | End: 2023-11-18

## 2023-11-18 RX ORDER — HYDRALAZINE HYDROCHLORIDE 20 MG/ML
10 INJECTION INTRAMUSCULAR; INTRAVENOUS
Status: COMPLETED | OUTPATIENT
Start: 2023-11-18 | End: 2023-11-18

## 2023-11-18 RX ORDER — ONDANSETRON 4 MG/1
8 TABLET, ORALLY DISINTEGRATING ORAL EVERY 6 HOURS PRN
Qty: 15 TABLET | Refills: 0 | Status: SHIPPED | OUTPATIENT
Start: 2023-11-18 | End: 2024-02-24

## 2023-11-18 RX ADMIN — ONDANSETRON 4 MG: 2 INJECTION INTRAMUSCULAR; INTRAVENOUS at 01:11

## 2023-11-18 RX ADMIN — HYDRALAZINE HYDROCHLORIDE 10 MG: 20 INJECTION, SOLUTION INTRAMUSCULAR; INTRAVENOUS at 12:11

## 2023-11-18 RX ADMIN — ACETAMINOPHEN 1000 MG: 500 TABLET ORAL at 12:11

## 2023-11-18 NOTE — ED TRIAGE NOTES
Patient to ED via EMS for nausea, vomiting, diarrhea for 2 days. Patient stated he has been unable tp keep fluids, food, and medications down. EMS gave 4mg Zofran and 10mg Compazine with relief of nausea symptoms. Patient in no acute distress on arrival.

## 2023-11-18 NOTE — ED NOTES
AVS, meds, and follow up discussed with patient. Patient verbalized agreement/understanding. Patient left the dept at this time with family.

## 2023-11-18 NOTE — ED PROVIDER NOTES
Encounter Date: 11/17/2023       History     Chief Complaint   Patient presents with    Nausea    Emesis     56 y/o WM with PMH of DM, HTN, GERD, and ESRD with kidney transplant presents per EMS with c/o n/v/d with onset yesterday. Negative for fever/chills, or abdominal pain. Last normal BM yesterday.        Review of patient's allergies indicates:   Allergen Reactions    Doxycycline      Other reaction(s): Unknown    Morphine Other (See Comments) and Nausea And Vomiting     nausea  nausea    Oxycodone-acetaminophen Nausea Only     Past Medical History:   Diagnosis Date    Diabetes mellitus     Encounter for blood transfusion     GERD (gastroesophageal reflux disease)     Hypertension     Renal disorder      Past Surgical History:   Procedure Laterality Date    ABDOMINAL SURGERY      CHOLECYSTECTOMY      gastric pacemaker      KIDNEY TRANSPLANT       History reviewed. No pertinent family history.  Social History     Tobacco Use    Smoking status: Never   Substance Use Topics    Alcohol use: Never    Drug use: Never     Review of Systems   Constitutional:  Positive for chills. Negative for fever.   HENT:  Negative for congestion, sinus pressure, sinus pain, sore throat and trouble swallowing.    Respiratory:  Negative for apnea, cough, choking, chest tightness, shortness of breath, wheezing and stridor.    Cardiovascular:  Negative for chest pain, palpitations and leg swelling.   Gastrointestinal:  Positive for diarrhea, nausea and vomiting. Negative for abdominal distention, abdominal pain, anal bleeding, blood in stool, constipation and rectal pain.   Genitourinary:  Negative for difficulty urinating, dysuria, enuresis, flank pain, frequency, genital sores and hematuria.   Musculoskeletal:  Negative for arthralgias and myalgias.   Neurological:  Negative for dizziness and headaches.   Hematological: Negative.    Psychiatric/Behavioral: Negative.     All other systems reviewed and are negative.      Physical Exam      Initial Vitals [11/17/23 2352]   BP Pulse Resp Temp SpO2   (!) 179/87 95 18 (!) 100.5 °F (38.1 °C) 96 %      MAP       --         Physical Exam    Nursing note and vitals reviewed.  Constitutional: He appears well-developed and well-nourished. No distress.   HENT:   Head: Normocephalic.   Eyes: Conjunctivae and EOM are normal. No scleral icterus.   Neck: Neck supple.   Normal range of motion.  Cardiovascular:  Normal rate, regular rhythm, normal heart sounds and intact distal pulses.     Exam reveals no gallop and no friction rub.       No murmur heard.  Pulmonary/Chest: Breath sounds normal. No respiratory distress. He has no wheezes. He has no rhonchi. He has no rales. He exhibits no tenderness.   Abdominal: Abdomen is soft. Bowel sounds are normal. He exhibits no distension and no mass. There is no abdominal tenderness. There is no rebound and no guarding.   Musculoskeletal:         General: No tenderness. Normal range of motion.      Cervical back: Normal range of motion and neck supple.     Lymphadenopathy:     He has no cervical adenopathy.   Neurological: He is alert and oriented to person, place, and time. He has normal strength.   Skin: Skin is warm and dry. Capillary refill takes less than 2 seconds.   Psychiatric: He has a normal mood and affect. His behavior is normal. Judgment and thought content normal.         Medical Screening Exam   See Full Note    ED Course   Procedures  Labs Reviewed   RAPID INFLUENZA A/B - Normal   SARS ANTIGEN(ANGELLA) - Normal    Narrative:     Negative SARS-CoV results should not be used as the sole basis for treatment or patient management decisions; negative results should be considered in the context of a patient's recent exposures, history and the presene of clinical signs and symptoms consistent with COVID-19.  Negative results should be treated as presumptive and confirmed by molecular assay, if necessary for patient management.          Imaging Results    None           Medications   ondansetron injection 4 mg (has no administration in time range)   acetaminophen tablet 1,000 mg (1,000 mg Oral Given 11/18/23 0005)   hydrALAZINE injection 10 mg (10 mg Intravenous Given 11/18/23 0016)     Medical Decision Making  58 y/o WM with PMH of DM, HTN, GERD, and ESRD with kidney transplant presents per EMS with c/o n/v/d with onset yesterday. Negative for fever/chills, or abdominal pain. Last normal BM yesterday.    Amount and/or Complexity of Data Reviewed  Labs: ordered.     Details: Covid: Negative  Flu: Negative    Risk  OTC drugs.                                   Clinical Impression:   Final diagnoses:  [A08.4] Viral gastroenteritis (Primary)        ED Disposition Condition    Discharge Stable          ED Prescriptions       Medication Sig Dispense Start Date End Date Auth. Provider    ondansetron (ZOFRAN-ODT) 4 MG TbDL Take 2 tablets (8 mg total) by mouth every 6 (six) hours as needed (nausea). 15 tablet 11/18/2023 -- Elijah Zamora FNP    promethazine (PHENERGAN) 25 MG suppository Place 1 suppository (25 mg total) rectally every 6 (six) hours as needed for Nausea (Nausea not relieved with Zofran). 10 suppository 11/18/2023 -- Elijah Zamora FNP          Follow-up Information       Follow up With Specialties Details Why Contact Info    Nikolai Garcia NP Family Medicine Go to  As needed, for follow up [facility] [address] [place] [address] [place] [address] [phone]               Elijah Zamora FNP  11/18/23 0124

## 2024-02-24 ENCOUNTER — HOSPITAL ENCOUNTER (EMERGENCY)
Facility: HOSPITAL | Age: 59
Discharge: HOME | End: 2024-02-24
Payer: MEDICARE

## 2024-02-24 VITALS
HEART RATE: 82 BPM | HEIGHT: 70 IN | OXYGEN SATURATION: 98 % | WEIGHT: 180 LBS | DIASTOLIC BLOOD PRESSURE: 79 MMHG | SYSTOLIC BLOOD PRESSURE: 166 MMHG | TEMPERATURE: 98 F | BODY MASS INDEX: 25.77 KG/M2 | RESPIRATION RATE: 16 BRPM

## 2024-02-24 DIAGNOSIS — A08.4 VIRAL GASTROENTERITIS: Primary | ICD-10-CM

## 2024-02-24 LAB
ALBUMIN SERPL BCP-MCNC: 2.8 G/DL (ref 3.5–5)
ALBUMIN/GLOB SERPL: 0.9 {RATIO}
ALP SERPL-CCNC: 63 U/L (ref 45–115)
ALT SERPL W P-5'-P-CCNC: 22 U/L (ref 16–61)
ANION GAP SERPL CALCULATED.3IONS-SCNC: 19 MMOL/L (ref 7–16)
AST SERPL W P-5'-P-CCNC: 23 U/L (ref 15–37)
BASOPHILS # BLD AUTO: 0.01 K/UL (ref 0–0.2)
BASOPHILS NFR BLD AUTO: 0.2 % (ref 0–1)
BILIRUB SERPL-MCNC: 0.6 MG/DL (ref ?–1.2)
BUN SERPL-MCNC: 52 MG/DL (ref 7–18)
BUN/CREAT SERPL: 14 (ref 6–20)
CALCIUM SERPL-MCNC: 8.6 MG/DL (ref 8.5–10.1)
CHLORIDE SERPL-SCNC: 104 MMOL/L (ref 98–107)
CO2 SERPL-SCNC: 18 MMOL/L (ref 21–32)
CREAT SERPL-MCNC: 3.79 MG/DL (ref 0.7–1.3)
DIFFERENTIAL METHOD BLD: ABNORMAL
EGFR (NO RACE VARIABLE) (RUSH/TITUS): 18 ML/MIN/1.73M2
EOSINOPHIL # BLD AUTO: 0.05 K/UL (ref 0–0.5)
EOSINOPHIL NFR BLD AUTO: 1 % (ref 1–4)
ERYTHROCYTE [DISTWIDTH] IN BLOOD BY AUTOMATED COUNT: 13.6 % (ref 11.5–14.5)
GLOBULIN SER-MCNC: 3.1 G/DL (ref 2–4)
GLUCOSE SERPL-MCNC: 179 MG/DL (ref 74–106)
GROUP A STREP MOLECULAR (OHS): NEGATIVE
HCT VFR BLD AUTO: 36.8 % (ref 40–54)
HGB BLD-MCNC: 11.7 G/DL (ref 13.5–18)
INFLUENZA A MOLECULAR (OHS): NEGATIVE
INFLUENZA B MOLECULAR (OHS): NEGATIVE
LACTATE SERPL-SCNC: 0.9 MMOL/L (ref 0.4–2)
LYMPHOCYTES # BLD AUTO: 0.81 K/UL (ref 1–4.8)
LYMPHOCYTES NFR BLD AUTO: 16.8 % (ref 27–41)
MCH RBC QN AUTO: 29 PG (ref 27–31)
MCHC RBC AUTO-ENTMCNC: 31.8 G/DL (ref 32–36)
MCV RBC AUTO: 91.1 FL (ref 80–96)
MONOCYTES # BLD AUTO: 0.77 K/UL (ref 0–0.8)
MONOCYTES NFR BLD AUTO: 16 % (ref 2–6)
MPC BLD CALC-MCNC: 10.5 FL (ref 9.4–12.4)
NEUTROPHILS # BLD AUTO: 3.18 K/UL (ref 1.8–7.7)
NEUTROPHILS NFR BLD AUTO: 66 % (ref 53–65)
PLATELET # BLD AUTO: 154 K/UL (ref 150–400)
POTASSIUM SERPL-SCNC: 4.2 MMOL/L (ref 3.5–5.1)
PROT SERPL-MCNC: 5.9 G/DL (ref 6.4–8.2)
RBC # BLD AUTO: 4.04 M/UL (ref 4.6–6.2)
SARS-COV+SARS-COV-2 AG RESP QL IA.RAPID: NEGATIVE
SODIUM SERPL-SCNC: 137 MMOL/L (ref 136–145)
WBC # BLD AUTO: 4.82 K/UL (ref 4.5–11)

## 2024-02-24 PROCEDURE — 87502 INFLUENZA DNA AMP PROBE: CPT | Performed by: NURSE PRACTITIONER

## 2024-02-24 PROCEDURE — 96361 HYDRATE IV INFUSION ADD-ON: CPT

## 2024-02-24 PROCEDURE — 85025 COMPLETE CBC W/AUTO DIFF WBC: CPT | Performed by: NURSE PRACTITIONER

## 2024-02-24 PROCEDURE — 25000003 PHARM REV CODE 250: Performed by: NURSE PRACTITIONER

## 2024-02-24 PROCEDURE — 87899 AGENT NOS ASSAY W/OPTIC: CPT | Performed by: NURSE PRACTITIONER

## 2024-02-24 PROCEDURE — 96375 TX/PRO/DX INJ NEW DRUG ADDON: CPT

## 2024-02-24 PROCEDURE — 80053 COMPREHEN METABOLIC PANEL: CPT | Performed by: NURSE PRACTITIONER

## 2024-02-24 PROCEDURE — 87651 STREP A DNA AMP PROBE: CPT | Performed by: NURSE PRACTITIONER

## 2024-02-24 PROCEDURE — 83605 ASSAY OF LACTIC ACID: CPT | Performed by: NURSE PRACTITIONER

## 2024-02-24 PROCEDURE — 96374 THER/PROPH/DIAG INJ IV PUSH: CPT

## 2024-02-24 PROCEDURE — 87015 SPECIMEN INFECT AGNT CONCNTJ: CPT | Performed by: NURSE PRACTITIONER

## 2024-02-24 PROCEDURE — 87426 SARSCOV CORONAVIRUS AG IA: CPT | Performed by: NURSE PRACTITIONER

## 2024-02-24 PROCEDURE — 99284 EMERGENCY DEPT VISIT MOD MDM: CPT | Mod: GF | Performed by: NURSE PRACTITIONER

## 2024-02-24 PROCEDURE — 63600175 PHARM REV CODE 636 W HCPCS: Performed by: NURSE PRACTITIONER

## 2024-02-24 PROCEDURE — 87046 STOOL CULTR AEROBIC BACT EA: CPT | Performed by: NURSE PRACTITIONER

## 2024-02-24 PROCEDURE — 99284 EMERGENCY DEPT VISIT MOD MDM: CPT | Mod: 25

## 2024-02-24 RX ORDER — KETOROLAC TROMETHAMINE 30 MG/ML
15 INJECTION, SOLUTION INTRAMUSCULAR; INTRAVENOUS
Status: COMPLETED | OUTPATIENT
Start: 2024-02-24 | End: 2024-02-24

## 2024-02-24 RX ORDER — HYDROMORPHONE HYDROCHLORIDE 2 MG/ML
1 INJECTION, SOLUTION INTRAMUSCULAR; INTRAVENOUS; SUBCUTANEOUS
Status: COMPLETED | OUTPATIENT
Start: 2024-02-24 | End: 2024-02-24

## 2024-02-24 RX ORDER — PROMETHAZINE HYDROCHLORIDE 25 MG/1
25 TABLET ORAL EVERY 6 HOURS PRN
Qty: 15 TABLET | Refills: 0 | Status: SHIPPED | OUTPATIENT
Start: 2024-02-24

## 2024-02-24 RX ORDER — ONDANSETRON 4 MG/1
8 TABLET, ORALLY DISINTEGRATING ORAL EVERY 6 HOURS PRN
Qty: 15 TABLET | Refills: 0 | Status: SHIPPED | OUTPATIENT
Start: 2024-02-24

## 2024-02-24 RX ORDER — PROMETHAZINE HYDROCHLORIDE 25 MG/1
25 SUPPOSITORY RECTAL EVERY 6 HOURS PRN
Qty: 15 SUPPOSITORY | Refills: 0 | Status: SHIPPED | OUTPATIENT
Start: 2024-02-24

## 2024-02-24 RX ADMIN — HYDROMORPHONE HYDROCHLORIDE 1 MG: 2 INJECTION INTRAMUSCULAR; INTRAVENOUS; SUBCUTANEOUS at 07:02

## 2024-02-24 RX ADMIN — KETOROLAC TROMETHAMINE 15 MG: 30 INJECTION, SOLUTION INTRAMUSCULAR; INTRAVENOUS at 07:02

## 2024-02-24 RX ADMIN — SODIUM CHLORIDE 1000 ML: 9 INJECTION, SOLUTION INTRAVENOUS at 07:02

## 2024-02-24 NOTE — ED PROVIDER NOTES
Encounter Date: 2/24/2024       History     Chief Complaint   Patient presents with    Nausea    Vomiting     NVD X 3 DAYS WITH CHILLS.  PT IS A KIDNEY TRANSPLANT PT.  HAS NOT BEEN ABLE TO TAKE MEDS THIS MORNING.  PT GIVEN ZOFRAN 4MG IV WITH NS WO.     59 y/o WM with multiple co-morbidities including DM, HTN, gastroparesis, and ESRD with kidney transplant presents per EMS with c/o n/v/d and chills x 3 days. Last normal BM 3 days ago. Denies abd pain, bloody/mucous stools. Patient states his BS was 198 PTA to ED and he self-treated with insulin per insulin pump. BS currently 197 on arrival to ED.        Review of patient's allergies indicates:   Allergen Reactions    Doxycycline      Other reaction(s): Unknown    Morphine Other (See Comments) and Nausea And Vomiting     nausea  nausea    Oxycodone-acetaminophen Nausea Only     Past Medical History:   Diagnosis Date    Diabetes mellitus     Encounter for blood transfusion     GERD (gastroesophageal reflux disease)     Hypertension     Renal disorder      Past Surgical History:   Procedure Laterality Date    ABDOMINAL SURGERY      CHOLECYSTECTOMY      gastric pacemaker      KIDNEY TRANSPLANT       History reviewed. No pertinent family history.  Social History     Tobacco Use    Smoking status: Never   Substance Use Topics    Alcohol use: Never    Drug use: Never     Review of Systems   Constitutional:  Positive for chills. Negative for fever.   HENT:  Negative for congestion, ear pain, sinus pressure, sinus pain, sore throat and trouble swallowing.    Respiratory:  Negative for apnea, cough, choking, chest tightness, shortness of breath, wheezing and stridor.    Cardiovascular:  Negative for chest pain, palpitations and leg swelling.   Gastrointestinal:  Positive for diarrhea, nausea and vomiting. Negative for abdominal distention, abdominal pain, anal bleeding, blood in stool, constipation and rectal pain.   Endocrine: Negative.    Genitourinary: Negative.     Musculoskeletal: Negative.    Skin: Negative.    Neurological: Negative.    Psychiatric/Behavioral: Negative.     All other systems reviewed and are negative.      Physical Exam     Initial Vitals [02/24/24 0644]   BP Pulse Resp Temp SpO2   (!) 171/83 80 16 98.1 °F (36.7 °C) 98 %      MAP       --         Physical Exam    Nursing note and vitals reviewed.  Constitutional: He appears well-developed and well-nourished. No distress.   HENT:   Head: Normocephalic.   Eyes: Conjunctivae and EOM are normal. No scleral icterus.   Neck: Neck supple.   Normal range of motion.  Cardiovascular:  Normal rate, regular rhythm, normal heart sounds and intact distal pulses.     Exam reveals no gallop and no friction rub.       No murmur heard.  Pulmonary/Chest: Breath sounds normal. No respiratory distress. He has no wheezes. He has no rhonchi. He has no rales. He exhibits no tenderness.   Abdominal: Abdomen is soft. Bowel sounds are normal. He exhibits no distension and no mass. There is no abdominal tenderness. There is no rebound and no guarding.   Musculoskeletal:         General: No tenderness. Normal range of motion.      Cervical back: Normal range of motion and neck supple.     Neurological: He is alert and oriented to person, place, and time. He has normal strength.   Skin: Skin is warm and dry. Capillary refill takes less than 2 seconds.   Psychiatric: He has a normal mood and affect. His behavior is normal. Judgment and thought content normal.         Medical Screening Exam   See Full Note    ED Course   Procedures  Labs Reviewed   COMPREHENSIVE METABOLIC PANEL - Abnormal; Notable for the following components:       Result Value    CO2 18 (*)     Anion Gap 19 (*)     Glucose 179 (*)     BUN 52 (*)     Creatinine 3.79 (*)     Total Protein 5.9 (*)     Albumin 2.8 (*)     eGFR 18 (*)     All other components within normal limits   CBC WITH DIFFERENTIAL - Abnormal; Notable for the following components:    RBC 4.04 (*)      Hemoglobin 11.7 (*)     Hematocrit 36.8 (*)     MCHC 31.8 (*)     Neutrophils % 66.0 (*)     Lymphocytes % 16.8 (*)     Lymphocytes, Absolute 0.81 (*)     Monocytes % 16.0 (*)     All other components within normal limits   INFLUENZA A & B BY MOLECULAR - Normal   STREP A BY MOLECULAR METHOD - Normal   SARS ANTIGEN(ANGELLA) - Normal    Narrative:     Negative SARS-CoV results should not be used as the sole basis for treatment or patient management decisions; negative results should be considered in the context of a patient's recent exposures, history and the presene of clinical signs and symptoms consistent with COVID-19.  Negative results should be treated as presumptive and confirmed by molecular assay, if necessary for patient management.   LACTIC ACID, PLASMA - Normal   CULTURE, STOOL   CBC W/ AUTO DIFFERENTIAL    Narrative:     The following orders were created for panel order CBC auto differential.  Procedure                               Abnormality         Status                     ---------                               -----------         ------                     CBC with Differential[[id number]]       Abnormal            Final result                 Please view results for these tests on the individual orders.          Imaging Results    None          Medications   sodium chloride 0.9% bolus 1,000 mL 1,000 mL (0 mLs Intravenous Stopped 2/24/24 0808)   ketorolac injection 15 mg (15 mg Intravenous Given 2/24/24 0710)   HYDROmorphone (PF) injection 1 mg (1 mg Intravenous Given 2/24/24 0730)     Medical Decision Making  59 y/o WM with multiple co-morbidities including DM, HTN, gastroparesis, and ESRD with kidney transplant presents per EMS with c/o n/v/d and chills x 3 days. Last normal BM 3 days ago. Denies abd pain, bloody/mucous stools. Patient states his BS was 198 PTA to ED and he self-treated with insulin per insulin pump. BS currently 197 on arrival to ED.    Amount and/or Complexity of Data  Reviewed  Labs: ordered.     Details: CBC:  CMB:  Lactate:  Covid:  Flu:  Strep:  Stool Culture:    Risk  Prescription drug management.               ED Course as of 02/24/24 0907   Sat Feb 24, 2024 0725 Patient c/o hurting all over. Dilaudid 1 mg IV given . [NJ]   0738 Patient states nausea and pain has improved.  [NJ]      ED Course User Index  [NJ] Elijah Zamora FNP                           Clinical Impression:   Final diagnoses:  [A08.4] Viral gastroenteritis (Primary)        ED Disposition Condition    Discharge Stable          ED Prescriptions       Medication Sig Dispense Start Date End Date Auth. Provider    promethazine (PHENERGAN) 25 MG tablet Take 1 tablet (25 mg total) by mouth every 6 (six) hours as needed for Nausea. 15 tablet 2/24/2024 -- Elijah Zamora FNP    ondansetron (ZOFRAN-ODT) 4 MG TbDL Take 2 tablets (8 mg total) by mouth every 6 (six) hours as needed (nausea). 15 tablet 2/24/2024 -- Elijah Zamora FNP          Follow-up Information       Follow up With Specialties Details Why Contact Info    Nikolai Garcia NP Family Medicine Go to  As needed, for follow up [address] [place] [address] [place] [address] [phone]               Elijah Zamora FNP  02/24/24 0907

## 2024-02-25 LAB
CAMPYLOBACTER ANTIGEN: NEGATIVE
EHEC (SHIGA TOXIN 1): NEGATIVE
EHEC (SHIGA TOXIN 2): NEGATIVE

## 2024-02-26 LAB — SALM+SHIG+E COLI ETEC STL CULT: NORMAL

## 2024-11-25 ENCOUNTER — HOSPITAL ENCOUNTER (OUTPATIENT)
Dept: RADIOLOGY | Facility: HOSPITAL | Age: 59
Discharge: HOME | End: 2024-11-25
Attending: REGISTERED NURSE
Payer: MEDICARE

## 2024-11-25 DIAGNOSIS — R05.1 ACUTE COUGH: ICD-10-CM

## 2024-11-25 DIAGNOSIS — R06.02 SHORTNESS OF BREATH: ICD-10-CM

## 2024-11-25 DIAGNOSIS — J40 BRONCHITIS, NOT SPECIFIED AS ACUTE OR CHRONIC: ICD-10-CM

## 2024-11-25 PROCEDURE — 71046 X-RAY EXAM CHEST 2 VIEWS: CPT | Mod: 26,,, | Performed by: RADIOLOGY

## 2024-11-25 PROCEDURE — 71046 X-RAY EXAM CHEST 2 VIEWS: CPT | Mod: TC

## 2024-12-23 ENCOUNTER — HOSPITAL ENCOUNTER (EMERGENCY)
Facility: HOSPITAL | Age: 59
Discharge: HOME | End: 2024-12-23
Payer: MEDICARE

## 2024-12-23 VITALS
SYSTOLIC BLOOD PRESSURE: 183 MMHG | RESPIRATION RATE: 16 BRPM | TEMPERATURE: 99 F | OXYGEN SATURATION: 98 % | BODY MASS INDEX: 29.47 KG/M2 | WEIGHT: 199 LBS | HEIGHT: 69 IN | DIASTOLIC BLOOD PRESSURE: 88 MMHG | HEART RATE: 78 BPM

## 2024-12-23 DIAGNOSIS — M54.50 ACUTE RIGHT-SIDED LOW BACK PAIN WITHOUT SCIATICA: Primary | ICD-10-CM

## 2024-12-23 PROCEDURE — 99284 EMERGENCY DEPT VISIT MOD MDM: CPT | Mod: GF | Performed by: NURSE PRACTITIONER

## 2024-12-23 PROCEDURE — 96376 TX/PRO/DX INJ SAME DRUG ADON: CPT

## 2024-12-23 PROCEDURE — 63600175 PHARM REV CODE 636 W HCPCS: Performed by: NURSE PRACTITIONER

## 2024-12-23 PROCEDURE — 99284 EMERGENCY DEPT VISIT MOD MDM: CPT | Mod: 25

## 2024-12-23 PROCEDURE — 96374 THER/PROPH/DIAG INJ IV PUSH: CPT

## 2024-12-23 PROCEDURE — 96375 TX/PRO/DX INJ NEW DRUG ADDON: CPT

## 2024-12-23 RX ORDER — ONDANSETRON HYDROCHLORIDE 2 MG/ML
4 INJECTION, SOLUTION INTRAVENOUS
Status: COMPLETED | OUTPATIENT
Start: 2024-12-23 | End: 2024-12-23

## 2024-12-23 RX ORDER — HYDRALAZINE HYDROCHLORIDE 20 MG/ML
10 INJECTION INTRAMUSCULAR; INTRAVENOUS
Status: COMPLETED | OUTPATIENT
Start: 2024-12-23 | End: 2024-12-23

## 2024-12-23 RX ORDER — HYDROMORPHONE HYDROCHLORIDE 1 MG/ML
1 INJECTION, SOLUTION INTRAMUSCULAR; INTRAVENOUS; SUBCUTANEOUS
Status: COMPLETED | OUTPATIENT
Start: 2024-12-23 | End: 2024-12-23

## 2024-12-23 RX ORDER — CYCLOBENZAPRINE HCL 10 MG
10 TABLET ORAL 3 TIMES DAILY PRN
Qty: 15 TABLET | Refills: 0 | Status: SHIPPED | OUTPATIENT
Start: 2024-12-23 | End: 2024-12-28

## 2024-12-23 RX ORDER — HYDROCODONE BITARTRATE AND ACETAMINOPHEN 10; 325 MG/1; MG/1
1 TABLET ORAL EVERY 6 HOURS PRN
Qty: 15 TABLET | Refills: 0 | Status: SHIPPED | OUTPATIENT
Start: 2024-12-23

## 2024-12-23 RX ORDER — ORPHENADRINE CITRATE 30 MG/ML
60 INJECTION INTRAMUSCULAR; INTRAVENOUS
Status: COMPLETED | OUTPATIENT
Start: 2024-12-23 | End: 2024-12-23

## 2024-12-23 RX ORDER — ONDANSETRON 4 MG/1
4 TABLET, ORALLY DISINTEGRATING ORAL EVERY 6 HOURS PRN
Qty: 15 TABLET | Refills: 0 | Status: SHIPPED | OUTPATIENT
Start: 2024-12-23

## 2024-12-23 RX ADMIN — HYDROMORPHONE HYDROCHLORIDE 1 MG: 1 INJECTION, SOLUTION INTRAMUSCULAR; INTRAVENOUS; SUBCUTANEOUS at 11:12

## 2024-12-23 RX ADMIN — HYDRALAZINE HYDROCHLORIDE 10 MG: 20 INJECTION INTRAMUSCULAR; INTRAVENOUS at 10:12

## 2024-12-23 RX ADMIN — ORPHENADRINE CITRATE 60 MG: 30 INJECTION, SOLUTION INTRAMUSCULAR; INTRAVENOUS at 10:12

## 2024-12-23 RX ADMIN — ONDANSETRON 4 MG: 2 INJECTION INTRAMUSCULAR; INTRAVENOUS at 10:12

## 2024-12-23 RX ADMIN — HYDROMORPHONE HYDROCHLORIDE 1 MG: 1 INJECTION, SOLUTION INTRAMUSCULAR; INTRAVENOUS; SUBCUTANEOUS at 10:12

## 2024-12-24 NOTE — ED PROVIDER NOTES
Encounter Date: 12/23/2024       History     Chief Complaint   Patient presents with    Back Pain     Back pain x2 weeks, intermittent. Worse this evening since 6pm. Pt has been taking tylenol, last dose was at 6pm.  Pt saw chiropratctor two weeks ago.      60 y/o WM presents pov with c/o intermittent back pain x 2 weeks. States pain worsened tonight at 6 PM. Took Tylenol at 6PM with no relief. Patient denies injury but states he helped skin a deer this evening prior to back pain worsening. Locates back pain right paraspinal mid back and rates pain 10/10. Describes pain as intense aching exacerbated by movement. States pain improves a little if he stands straight and places his hands behind his back with wrist crossed and arms straight. Patient went to chiropractor 2 weeks ago when back pain first began.    The history is provided by the patient.     Review of patient's allergies indicates:   Allergen Reactions    Doxycycline      Other reaction(s): Unknown    Morphine Other (See Comments) and Nausea And Vomiting     nausea  nausea    Oxycodone-acetaminophen Nausea Only     Past Medical History:   Diagnosis Date    Diabetes mellitus     Encounter for blood transfusion     GERD (gastroesophageal reflux disease)     Hypertension     Renal disorder      Past Surgical History:   Procedure Laterality Date    ABDOMINAL SURGERY      CHOLECYSTECTOMY      gastric pacemaker      KIDNEY TRANSPLANT       No family history on file.  Social History     Tobacco Use    Smoking status: Never   Substance Use Topics    Alcohol use: Never    Drug use: Never     Review of Systems   Respiratory: Negative.  Negative for apnea, cough, choking, chest tightness, shortness of breath, wheezing and stridor.    Cardiovascular: Negative.  Negative for chest pain, palpitations and leg swelling.   Gastrointestinal: Negative.    Genitourinary: Negative.    Musculoskeletal:  Positive for back pain.   Skin: Negative.    Neurological: Negative.     Psychiatric/Behavioral: Negative.     All other systems reviewed and are negative.      Physical Exam     Initial Vitals [12/23/24 2158]   BP Pulse Resp Temp SpO2   (!) 229/119 74 18 98.6 °F (37 °C) 99 %      MAP       --         Physical Exam    Nursing note and vitals reviewed.  Constitutional: He appears well-developed and well-nourished. No distress.   HENT:   Head: Normocephalic.   Eyes: Conjunctivae and EOM are normal. No scleral icterus.   Neck: Neck supple.   Normal range of motion.  Cardiovascular:  Normal rate, regular rhythm, normal heart sounds and intact distal pulses.     Exam reveals no gallop and no friction rub.       No murmur heard.  Pulmonary/Chest: Breath sounds normal. No respiratory distress. He has no wheezes. He has no rhonchi. He has no rales. He exhibits no tenderness.   Musculoskeletal:      Cervical back: Normal, normal range of motion and neck supple.      Thoracic back: Normal.      Lumbar back: Spasms present. No swelling, deformity, signs of trauma, tenderness or bony tenderness. Normal range of motion. Negative right straight leg raise test and negative left straight leg raise test.     Neurological: He is alert and oriented to person, place, and time. He has normal strength.   Skin: Skin is warm and dry. Capillary refill takes less than 2 seconds.   Psychiatric: He has a normal mood and affect. His behavior is normal. Judgment and thought content normal.         Medical Screening Exam   See Full Note    ED Course   Procedures  Labs Reviewed - No data to display       Imaging Results    None          Medications   HYDROmorphone injection 1 mg (1 mg Intravenous Given 12/23/24 2220)   ondansetron injection 4 mg (4 mg Intravenous Given 12/23/24 2220)   orphenadrine injection 60 mg (60 mg Intravenous Given 12/23/24 2225)   hydrALAZINE injection 10 mg (10 mg Intravenous Given 12/23/24 2256)   HYDROmorphone injection 1 mg (1 mg Intravenous Given 12/23/24 2321)     Medical Decision  Making  60 y/o WM presents pov with c/o intermittent back pain x 2 weeks. States pain worsened tonight at 6 PM. Took Tylenol at 6PM with no relief. Patient denies injury but states he helped skin a deer this evening prior to back pain worsening. Locates back pain right paraspinal mid back and rates pain 10/10. Describes pain as intense aching exacerbated by movement. States pain improves a little if he stands straight and places his hands behind his back with wrist crossed and arms straight. Patient went to chiropractor 2 weeks ago when back pain first began.    Risk  Prescription drug management.               ED Course as of 12/23/24 2336   Mon Dec 23, 2024   2332 Patient states back has resolved and currently denies pain. [NJ]      ED Course User Index  [NJ] Elijah Zamora FNP                           Clinical Impression:   Final diagnoses:  [M54.50] Acute right-sided low back pain without sciatica (Primary)        ED Disposition Condition    Discharge Stable          ED Prescriptions       Medication Sig Dispense Start Date End Date Auth. Provider    cyclobenzaprine (FLEXERIL) 10 MG tablet Take 1 tablet (10 mg total) by mouth 3 (three) times daily as needed for Muscle spasms. 15 tablet 12/23/2024 12/28/2024 Elijah Zamora FNP    HYDROcodone-acetaminophen (NORCO)  mg per tablet Take 1 tablet by mouth every 6 (six) hours as needed for Pain (back pain). 15 tablet 12/23/2024 -- Elijah Zamora FNP    ondansetron (ZOFRAN-ODT) 4 MG TbDL Take 1 tablet (4 mg total) by mouth every 6 (six) hours as needed (nausea). 15 tablet 12/23/2024 -- Elijah Zamora FNP          Follow-up Information       Follow up With Specialties Details Why Contact Info    Nikolai Garcia NP Family Medicine Go to  As needed, for follow up [address] [place] [address] [place] [address] [phone]               Elijah Zamora FNP  12/23/24 2336

## 2025-03-04 ENCOUNTER — HOSPITAL ENCOUNTER (EMERGENCY)
Facility: HOSPITAL | Age: 60
Discharge: ANOTHER HOSPITAL | End: 2025-03-04
Payer: MEDICARE

## 2025-03-04 VITALS
DIASTOLIC BLOOD PRESSURE: 85 MMHG | BODY MASS INDEX: 31.49 KG/M2 | TEMPERATURE: 98 F | HEART RATE: 85 BPM | SYSTOLIC BLOOD PRESSURE: 176 MMHG | HEIGHT: 68 IN | OXYGEN SATURATION: 97 % | WEIGHT: 207.81 LBS | RESPIRATION RATE: 21 BRPM

## 2025-03-04 DIAGNOSIS — E83.51 HYPOCALCEMIA: Primary | ICD-10-CM

## 2025-03-04 LAB
ALBUMIN SERPL BCP-MCNC: 2.2 G/DL (ref 3.5–5)
ALP SERPL-CCNC: 81 U/L (ref 40–150)
ALT SERPL W P-5'-P-CCNC: 33 U/L
ANION GAP SERPL CALCULATED.3IONS-SCNC: 14 MMOL/L (ref 7–16)
AST SERPL W P-5'-P-CCNC: 52 U/L (ref 5–34)
BASOPHILS # BLD AUTO: 0.02 K/UL (ref 0–0.2)
BASOPHILS NFR BLD AUTO: 0.2 % (ref 0–1)
BILIRUB DIRECT SERPL-MCNC: 0.1 MG/DL
BILIRUB SERPL-MCNC: 0.3 MG/DL
BUN SERPL-MCNC: 63 MG/DL (ref 8–26)
BUN/CREAT SERPL: 13 (ref 6–20)
CALCIUM SERPL-MCNC: 5.5 MG/DL (ref 8.4–10.2)
CALCIUM SERPL-MCNC: 5.7 MG/DL (ref 8.4–10.2)
CHLORIDE SERPL-SCNC: 111 MMOL/L (ref 98–107)
CO2 SERPL-SCNC: 18 MMOL/L (ref 22–29)
CREAT SERPL-MCNC: 4.93 MG/DL (ref 0.72–1.25)
DIFFERENTIAL METHOD BLD: ABNORMAL
EGFR (NO RACE VARIABLE) (RUSH/TITUS): 13 ML/MIN/1.73M2
EOSINOPHIL # BLD AUTO: 0.04 K/UL (ref 0–0.5)
EOSINOPHIL NFR BLD AUTO: 0.5 % (ref 1–4)
ERYTHROCYTE [DISTWIDTH] IN BLOOD BY AUTOMATED COUNT: 13.3 % (ref 11.5–14.5)
GLUCOSE SERPL-MCNC: 114 MG/DL (ref 74–100)
GLUCOSE SERPL-MCNC: 319 MG/DL (ref 70–105)
GLUCOSE SERPL-MCNC: 68 MG/DL (ref 70–105)
HCT VFR BLD AUTO: 34.2 % (ref 40–54)
HGB BLD-MCNC: 10.7 G/DL (ref 13.5–18)
LYMPHOCYTES # BLD AUTO: 0.91 K/UL (ref 1–4.8)
LYMPHOCYTES NFR BLD AUTO: 11.3 % (ref 27–41)
MAGNESIUM SERPL-MCNC: 1.7 MG/DL (ref 1.6–2.6)
MCH RBC QN AUTO: 28.6 PG (ref 27–31)
MCHC RBC AUTO-ENTMCNC: 31.3 G/DL (ref 32–36)
MCV RBC AUTO: 91.4 FL (ref 80–96)
MONOCYTES # BLD AUTO: 0.57 K/UL (ref 0–0.8)
MONOCYTES NFR BLD AUTO: 7.1 % (ref 2–6)
MPC BLD CALC-MCNC: 9.7 FL (ref 9.4–12.4)
NEUTROPHILS # BLD AUTO: 6.49 K/UL (ref 1.8–7.7)
NEUTROPHILS NFR BLD AUTO: 80.9 % (ref 53–65)
PHOSPHATE SERPL-MCNC: 4.9 MG/DL (ref 2.3–4.7)
PLATELET # BLD AUTO: 165 K/UL (ref 150–400)
POTASSIUM SERPL-SCNC: 4.7 MMOL/L (ref 3.5–5.1)
PROT SERPL-MCNC: 4.8 G/DL (ref 6.4–8.3)
RBC # BLD AUTO: 3.74 M/UL (ref 4.6–6.2)
SODIUM SERPL-SCNC: 138 MMOL/L (ref 136–145)
WBC # BLD AUTO: 8.03 K/UL (ref 4.5–11)

## 2025-03-04 PROCEDURE — 93010 ELECTROCARDIOGRAM REPORT: CPT | Mod: ,,, | Performed by: INTERNAL MEDICINE

## 2025-03-04 PROCEDURE — 83735 ASSAY OF MAGNESIUM: CPT | Performed by: NURSE PRACTITIONER

## 2025-03-04 PROCEDURE — 25000003 PHARM REV CODE 250: Performed by: NURSE PRACTITIONER

## 2025-03-04 PROCEDURE — 99285 EMERGENCY DEPT VISIT HI MDM: CPT | Mod: 25

## 2025-03-04 PROCEDURE — 36415 COLL VENOUS BLD VENIPUNCTURE: CPT | Performed by: NURSE PRACTITIONER

## 2025-03-04 PROCEDURE — 63600175 PHARM REV CODE 636 W HCPCS: Performed by: NURSE PRACTITIONER

## 2025-03-04 PROCEDURE — 93005 ELECTROCARDIOGRAM TRACING: CPT

## 2025-03-04 PROCEDURE — 80048 BASIC METABOLIC PNL TOTAL CA: CPT | Performed by: NURSE PRACTITIONER

## 2025-03-04 PROCEDURE — 84100 ASSAY OF PHOSPHORUS: CPT | Performed by: NURSE PRACTITIONER

## 2025-03-04 PROCEDURE — 96366 THER/PROPH/DIAG IV INF ADDON: CPT

## 2025-03-04 PROCEDURE — 96365 THER/PROPH/DIAG IV INF INIT: CPT

## 2025-03-04 PROCEDURE — 80076 HEPATIC FUNCTION PANEL: CPT | Performed by: NURSE PRACTITIONER

## 2025-03-04 PROCEDURE — 99291 CRITICAL CARE FIRST HOUR: CPT | Mod: GF | Performed by: NURSE PRACTITIONER

## 2025-03-04 PROCEDURE — 85025 COMPLETE CBC W/AUTO DIFF WBC: CPT | Performed by: NURSE PRACTITIONER

## 2025-03-04 PROCEDURE — 96372 THER/PROPH/DIAG INJ SC/IM: CPT | Performed by: NURSE PRACTITIONER

## 2025-03-04 PROCEDURE — 82310 ASSAY OF CALCIUM: CPT | Performed by: NURSE PRACTITIONER

## 2025-03-04 PROCEDURE — 82962 GLUCOSE BLOOD TEST: CPT

## 2025-03-04 RX ORDER — CALCIUM GLUCONATE 20 MG/ML
2 INJECTION, SOLUTION INTRAVENOUS
Status: COMPLETED | OUTPATIENT
Start: 2025-03-04 | End: 2025-03-04

## 2025-03-04 RX ORDER — INSULIN ASPART 100 [IU]/ML
15 INJECTION, SOLUTION INTRAVENOUS; SUBCUTANEOUS ONCE
Status: COMPLETED | OUTPATIENT
Start: 2025-03-04 | End: 2025-03-04

## 2025-03-04 RX ORDER — CALCIUM GLUCONATE 20 MG/ML
1 INJECTION, SOLUTION INTRAVENOUS
Status: COMPLETED | OUTPATIENT
Start: 2025-03-04 | End: 2025-03-04

## 2025-03-04 RX ADMIN — INSULIN ASPART 15 UNITS: 100 INJECTION, SOLUTION INTRAVENOUS; SUBCUTANEOUS at 07:03

## 2025-03-04 RX ADMIN — CALCIUM GLUCONATE 1 G: 20 INJECTION, SOLUTION INTRAVENOUS at 02:03

## 2025-03-04 RX ADMIN — CALCIUM GLUCONATE 2 G: 20 INJECTION, SOLUTION INTRAVENOUS at 05:03

## 2025-03-04 NOTE — ED NOTES
Presents to ER after being instructed to come to his local ED to receive a calcium infusion d/t calcium being critically low per [facility] transplant.   INT placed in L posterior forearm. Blood collected and taken to lab. No distress noted. Placed on continuous cardiac and pulse ox monitor.   Past med hx: Had kidney transplant 12 yrs ago, Open heart surgery 1 yr ago. On the transplant list currently for another kidney.   Wife present with pt at bedside.

## 2025-03-04 NOTE — ED NOTES
Verified telehealth fax received. Cart placed in room. Pt receiving calcium through his IV. No distress noted.

## 2025-03-04 NOTE — ED PROVIDER NOTES
Encounter Date: 3/4/2025       History     Chief Complaint   Patient presents with    Abnormal Lab Values    Hypocalcemia     Patient presents today for abnormal lab values.  And a kidney transplant 12 years ago and states it is beginning to fail.  He had a routine appointment with his nephrologist at [facility] who stefano labs.  Reports his nurse called them today telling him to come to the nearest emergency department due to abnormal labs.  Patient states he feels fine is not having any symptoms at this time.        Review of patient's allergies indicates:   Allergen Reactions    Doxycycline      Other reaction(s): Unknown    Morphine Other (See Comments) and Nausea And Vomiting     nausea  nausea    Oxycodone-acetaminophen Nausea Only     Past Medical History:   Diagnosis Date    Diabetes mellitus     Encounter for blood transfusion     GERD (gastroesophageal reflux disease)     Hypertension     Renal disorder      Past Surgical History:   Procedure Laterality Date    ABDOMINAL SURGERY      CHOLECYSTECTOMY      gastric pacemaker      KIDNEY TRANSPLANT      Open heart surgery  2024     No family history on file.  Social History[1]  Review of Systems   Constitutional: Negative.    Respiratory: Negative.     Cardiovascular: Negative.    All other systems reviewed and are negative.      Physical Exam     Initial Vitals [03/04/25 1326]   BP Pulse Resp Temp SpO2   (!) 165/96 72 15 97.8 °F (36.6 °C) 100 %      MAP       --         Physical Exam    Nursing note and vitals reviewed.  Constitutional: He appears well-developed and well-nourished.   Neck:   Normal range of motion.  Cardiovascular:  Normal rate, regular rhythm and normal heart sounds.           No murmur heard.  Pulmonary/Chest: Breath sounds normal. No respiratory distress.   Abdominal: Abdomen is soft. Bowel sounds are normal. He exhibits no distension.   Musculoskeletal:         General: Normal range of motion.      Cervical back: Normal range  of motion.     Neurological: He is alert and oriented to person, place, and time. He has normal strength. No cranial nerve deficit.   Skin: Skin is warm and dry.   Psychiatric: He has a normal mood and affect.         Medical Screening Exam   See Full Note    ED Course   Procedures  Labs Reviewed   PHOSPHORUS - Abnormal       Result Value    Phosphorus 4.9 (*)    BASIC METABOLIC PANEL - Abnormal    Sodium 138      Potassium 4.7      Chloride 111 (*)     CO2 18 (*)     Anion Gap 14      Glucose 114 (*)     BUN 63 (*)     Creatinine 4.93 (*)     BUN/Creatinine Ratio 13      Calcium 5.5 (*)     eGFR 13 (*)    HEPATIC FUNCTION PANEL - Abnormal    Total Protein 4.8 (*)     Albumin 2.2 (*)     Bilirubin, Total 0.3      Bilirubin, Direct 0.1      AST 52 (*)     ALT 33      Alk Phos 81     CBC WITH DIFFERENTIAL - Abnormal    WBC 8.03      RBC 3.74 (*)     Hemoglobin 10.7 (*)     Hematocrit 34.2 (*)     MCV 91.4      MCH 28.6      MCHC 31.3 (*)     RDW 13.3      Platelet Count 165      MPV 9.7      Neutrophils % 80.9 (*)     Lymphocytes % 11.3 (*)     Neutrophils, Abs 6.49      Lymphocytes, Absolute 0.91 (*)     Diff Type Auto      Monocytes % 7.1 (*)     Eosinophils % 0.5 (*)     Basophils % 0.2      Monocytes, Absolute 0.57      Eosinophils, Absolute 0.04      Basophils, Absolute 0.02     CALCIUM - Abnormal    Calcium 5.7 (*)    POCT GLUCOSE MONITORING CONTINUOUS - Abnormal    POC Glucose 68 (*)    MAGNESIUM - Normal    Magnesium 1.7     CBC W/ AUTO DIFFERENTIAL    Narrative:     The following orders were created for panel order CBC Auto Differential.  Procedure                               Abnormality         Status                     ---------                               -----------         ------                     CBC with Differential[[id number]]       Abnormal            Final result                 Please view results for these tests on the individual orders.          Imaging Results    None           Medications   calcium gluconate 1 g in NS IVPB (premixed) (2 g Intravenous New Bag 3/4/25 1756)   calcium gluconate 1 g in NS IVPB (premixed) (0 g Intravenous Stopped 3/4/25 1559)     Medical Decision Making  Amount and/or Complexity of Data Reviewed  Labs: ordered.    Risk  Prescription drug management.    Critical Care  Total time providing critical care: 45 minutes               ED Course as of 03/04/25 1805   Tue Mar 04, 2025   1451 All labs reviewed with notable abnormals being a calcium of 5.5.  I am ordering a EKG.  Patient does not have any significant symptoms other than some minor cramping he noticed last night in his hands when driving.    Patient has elevated BUN of 63 and a creatinine 4.93.  He is known to have stage 5 kidney disease and is in the process of getting a another transplant.   [BC]   1455 EKG:  Normal sinus rhythm at 70 beats per minute with normal VT and QRS.  QTC is prolonged at 494.  Nonspecific ST changes [BC]   1531 TeleMed consult with Dr. Brasher.  She recommends repeating the EKG and calcium 1 hour after calcium infusion.  If patient is remaining asymptomatic and his QTC has normalized he can be discharged home on oral calcium [BC]   1711 Repeat calcium 5.7.  EKG shows sinus rhythm at 70 beats per minute with continued prolonged QTC of 492. [BC]   1802 Case discussed with Dr. Brasher again.  She accepts patient for ER to ER transfer [BC]      ED Course User Index  [BC] Jean Orozco NP                           Clinical Impression:   Final diagnoses:  [E83.51] Hypocalcemia (Primary)        ED Disposition Condition    Transfer to Another Facility Stable                  [1]   Social History  Tobacco Use    Smoking status: Never   Substance Use Topics    Alcohol use: Never    Drug use: Never        Jean Orozco NP  03/04/25 1805

## 2025-03-04 NOTE — ED NOTES
Pt called out stating that he felt like his BS was low. POCT checked and it was 68. Orange juice and nabs given.

## 2025-03-05 NOTE — ED NOTES
Faxed [facility] transfer forms and verified received. Pt finished 2g calcium. Ate 100% of supper.

## 2025-03-05 NOTE — ED NOTES
BS checked at pts request and it was 319. Pt states he usually takes 15 units of Novolog around this time. Has to take a lot d/t being insulin resistant. Will let provider know.

## 2025-03-06 LAB
OHS QRS DURATION: 84 MS
OHS QRS DURATION: 84 MS
OHS QTC CALCULATION: 492 MS
OHS QTC CALCULATION: 494 MS

## 2025-08-28 ENCOUNTER — HOSPITAL ENCOUNTER (EMERGENCY)
Facility: HOSPITAL | Age: 60
Discharge: HOME | End: 2025-08-28
Payer: MEDICARE

## 2025-08-28 VITALS
OXYGEN SATURATION: 97 % | HEIGHT: 69 IN | DIASTOLIC BLOOD PRESSURE: 69 MMHG | RESPIRATION RATE: 16 BRPM | SYSTOLIC BLOOD PRESSURE: 144 MMHG | HEART RATE: 93 BPM | WEIGHT: 193.13 LBS | BODY MASS INDEX: 28.6 KG/M2 | TEMPERATURE: 99 F

## 2025-08-28 DIAGNOSIS — K52.9 GASTROENTERITIS: Primary | ICD-10-CM

## 2025-08-28 LAB
ALBUMIN SERPL BCP-MCNC: 3.1 G/DL (ref 3.5–5)
ALBUMIN/GLOB SERPL: 0.9 {RATIO}
ALP SERPL-CCNC: 109 U/L (ref 40–150)
ALT SERPL W P-5'-P-CCNC: 19 U/L
ANION GAP SERPL CALCULATED.3IONS-SCNC: 21 MMOL/L (ref 7–16)
AST SERPL W P-5'-P-CCNC: 34 U/L (ref 11–45)
BASOPHILS # BLD AUTO: 0.02 K/UL (ref 0–0.2)
BASOPHILS NFR BLD AUTO: 0.2 % (ref 0–1)
BILIRUB SERPL-MCNC: 0.8 MG/DL
BUN SERPL-MCNC: 34 MG/DL (ref 8–26)
BUN/CREAT SERPL: 5 (ref 6–20)
CALCIUM SERPL-MCNC: 8.9 MG/DL (ref 8.4–10.2)
CHLORIDE SERPL-SCNC: 100 MMOL/L (ref 98–107)
CO2 SERPL-SCNC: 25 MMOL/L (ref 22–29)
CREAT SERPL-MCNC: 6.85 MG/DL (ref 0.72–1.25)
DIFFERENTIAL METHOD BLD: ABNORMAL
EGFR (NO RACE VARIABLE) (RUSH/TITUS): 9 ML/MIN/1.73M2
EOSINOPHIL # BLD AUTO: 0.27 K/UL (ref 0–0.5)
EOSINOPHIL NFR BLD AUTO: 2.7 % (ref 1–4)
ERYTHROCYTE [DISTWIDTH] IN BLOOD BY AUTOMATED COUNT: 14.9 % (ref 11.5–14.5)
GLOBULIN SER-MCNC: 3.5 G/DL (ref 2–4)
GLUCOSE SERPL-MCNC: 65 MG/DL (ref 74–100)
HCT VFR BLD AUTO: 40.1 % (ref 40–54)
HGB BLD-MCNC: 12.5 G/DL (ref 13.5–18)
LYMPHOCYTES # BLD AUTO: 1.4 K/UL (ref 1–4.8)
LYMPHOCYTES NFR BLD AUTO: 14.2 % (ref 27–41)
MCH RBC QN AUTO: 29.7 PG (ref 27–31)
MCHC RBC AUTO-ENTMCNC: 31.2 G/DL (ref 32–36)
MCV RBC AUTO: 95.2 FL (ref 80–96)
MONOCYTES # BLD AUTO: 1.34 K/UL (ref 0–0.8)
MONOCYTES NFR BLD AUTO: 13.6 % (ref 2–6)
MPC BLD CALC-MCNC: 9.5 FL (ref 9.4–12.4)
NEUTROPHILS # BLD AUTO: 6.8 K/UL (ref 1.8–7.7)
NEUTROPHILS NFR BLD AUTO: 69.3 % (ref 53–65)
PLATELET # BLD AUTO: 132 K/UL (ref 150–400)
POCT GLUCOSE: 71 MG/DL (ref 70–110)
POTASSIUM SERPL-SCNC: 4.2 MMOL/L (ref 3.5–5.1)
PROT SERPL-MCNC: 6.6 G/DL (ref 6.4–8.3)
RBC # BLD AUTO: 4.21 M/UL (ref 4.6–6.2)
SODIUM SERPL-SCNC: 142 MMOL/L (ref 136–145)
WBC # BLD AUTO: 9.83 K/UL (ref 4.5–11)

## 2025-08-28 PROCEDURE — 99284 EMERGENCY DEPT VISIT MOD MDM: CPT | Mod: 25

## 2025-08-28 PROCEDURE — 96374 THER/PROPH/DIAG INJ IV PUSH: CPT

## 2025-08-28 PROCEDURE — 85025 COMPLETE CBC W/AUTO DIFF WBC: CPT | Performed by: NURSE PRACTITIONER

## 2025-08-28 PROCEDURE — 82962 GLUCOSE BLOOD TEST: CPT

## 2025-08-28 PROCEDURE — 99284 EMERGENCY DEPT VISIT MOD MDM: CPT | Mod: GF | Performed by: NURSE PRACTITIONER

## 2025-08-28 PROCEDURE — 80053 COMPREHEN METABOLIC PANEL: CPT | Performed by: NURSE PRACTITIONER

## 2025-08-28 PROCEDURE — 63600175 PHARM REV CODE 636 W HCPCS: Performed by: NURSE PRACTITIONER

## 2025-08-28 RX ORDER — PANTOPRAZOLE SODIUM 40 MG/1
40 TABLET, DELAYED RELEASE ORAL
COMMUNITY

## 2025-08-28 RX ORDER — INSULIN LISPRO 100 [IU]/ML
INJECTION, SOLUTION INTRAVENOUS; SUBCUTANEOUS
COMMUNITY
Start: 2025-08-22

## 2025-08-28 RX ORDER — PROMETHAZINE HYDROCHLORIDE 25 MG/1
25 TABLET ORAL EVERY 6 HOURS PRN
Qty: 15 TABLET | Refills: 0 | Status: SHIPPED | OUTPATIENT
Start: 2025-08-28

## 2025-08-28 RX ORDER — HALOPERIDOL LACTATE 5 MG/ML
5 INJECTION, SOLUTION INTRAMUSCULAR
Status: COMPLETED | OUTPATIENT
Start: 2025-08-28 | End: 2025-08-28

## 2025-08-28 RX ADMIN — HALOPERIDOL LACTATE 5 MG: 5 INJECTION, SOLUTION INTRAMUSCULAR at 06:08

## 2025-08-29 LAB — POCT GLUCOSE: 98 MG/DL (ref 70–110)
